# Patient Record
Sex: FEMALE | Race: WHITE | NOT HISPANIC OR LATINO | URBAN - METROPOLITAN AREA
[De-identification: names, ages, dates, MRNs, and addresses within clinical notes are randomized per-mention and may not be internally consistent; named-entity substitution may affect disease eponyms.]

---

## 2017-02-27 ENCOUNTER — EMERGENCY (EMERGENCY)
Facility: HOSPITAL | Age: 62
LOS: 1 days | Discharge: HOME | End: 2017-02-27
Attending: EMERGENCY MEDICINE | Admitting: EMERGENCY MEDICINE
Payer: MEDICAID

## 2017-02-27 VITALS
SYSTOLIC BLOOD PRESSURE: 135 MMHG | OXYGEN SATURATION: 98 % | HEART RATE: 97 BPM | RESPIRATION RATE: 20 BRPM | TEMPERATURE: 98 F | DIASTOLIC BLOOD PRESSURE: 80 MMHG

## 2017-02-27 DIAGNOSIS — R07.89 OTHER CHEST PAIN: ICD-10-CM

## 2017-02-27 LAB
ALBUMIN SERPL ELPH-MCNC: 4.6 G/DL — SIGNIFICANT CHANGE UP (ref 3.3–5)
ALP SERPL-CCNC: 93 U/L — SIGNIFICANT CHANGE UP (ref 40–120)
ALT FLD-CCNC: 24 U/L RC — SIGNIFICANT CHANGE UP (ref 10–45)
ANION GAP SERPL CALC-SCNC: 15 MMOL/L — SIGNIFICANT CHANGE UP (ref 5–17)
APTT BLD: 32.7 SEC — SIGNIFICANT CHANGE UP (ref 27.5–37.4)
AST SERPL-CCNC: 25 U/L — SIGNIFICANT CHANGE UP (ref 10–40)
BASOPHILS # BLD AUTO: 0.1 K/UL — SIGNIFICANT CHANGE UP (ref 0–0.2)
BASOPHILS NFR BLD AUTO: 0.8 % — SIGNIFICANT CHANGE UP (ref 0–2)
BILIRUB SERPL-MCNC: 0.6 MG/DL — SIGNIFICANT CHANGE UP (ref 0.2–1.2)
BUN SERPL-MCNC: 11 MG/DL — SIGNIFICANT CHANGE UP (ref 7–23)
CALCIUM SERPL-MCNC: 9.8 MG/DL — SIGNIFICANT CHANGE UP (ref 8.4–10.5)
CHLORIDE SERPL-SCNC: 97 MMOL/L — SIGNIFICANT CHANGE UP (ref 96–108)
CK MB CFR SERPL CALC: 1.8 NG/ML — SIGNIFICANT CHANGE UP (ref 0–3.8)
CK SERPL-CCNC: 69 U/L — SIGNIFICANT CHANGE UP (ref 25–170)
CO2 SERPL-SCNC: 26 MMOL/L — SIGNIFICANT CHANGE UP (ref 22–31)
CREAT SERPL-MCNC: 0.62 MG/DL — SIGNIFICANT CHANGE UP (ref 0.5–1.3)
D DIMER BLD IA.RAPID-MCNC: <150 NG/ML DDU — SIGNIFICANT CHANGE UP
EOSINOPHIL # BLD AUTO: 0 K/UL — SIGNIFICANT CHANGE UP (ref 0–0.5)
EOSINOPHIL NFR BLD AUTO: 0.1 % — SIGNIFICANT CHANGE UP (ref 0–6)
GLUCOSE SERPL-MCNC: 109 MG/DL — HIGH (ref 70–99)
HCT VFR BLD CALC: 45.3 % — HIGH (ref 34.5–45)
HGB BLD-MCNC: 15.4 G/DL — SIGNIFICANT CHANGE UP (ref 11.5–15.5)
INR BLD: 1.03 RATIO — SIGNIFICANT CHANGE UP (ref 0.88–1.16)
LYMPHOCYTES # BLD AUTO: 2 K/UL — SIGNIFICANT CHANGE UP (ref 1–3.3)
LYMPHOCYTES # BLD AUTO: 23.6 % — SIGNIFICANT CHANGE UP (ref 13–44)
MAGNESIUM SERPL-MCNC: 2 MG/DL — SIGNIFICANT CHANGE UP (ref 1.6–2.6)
MCHC RBC-ENTMCNC: 33.3 PG — SIGNIFICANT CHANGE UP (ref 27–34)
MCHC RBC-ENTMCNC: 33.9 GM/DL — SIGNIFICANT CHANGE UP (ref 32–36)
MCV RBC AUTO: 98.1 FL — SIGNIFICANT CHANGE UP (ref 80–100)
MONOCYTES # BLD AUTO: 0.9 K/UL — SIGNIFICANT CHANGE UP (ref 0–0.9)
MONOCYTES NFR BLD AUTO: 10.1 % — SIGNIFICANT CHANGE UP (ref 2–14)
NEUTROPHILS # BLD AUTO: 5.7 K/UL — SIGNIFICANT CHANGE UP (ref 1.8–7.4)
NEUTROPHILS NFR BLD AUTO: 65.4 % — SIGNIFICANT CHANGE UP (ref 43–77)
NT-PROBNP SERPL-SCNC: 681 PG/ML — HIGH (ref 0–300)
PHOSPHATE SERPL-MCNC: 4 MG/DL — SIGNIFICANT CHANGE UP (ref 2.5–4.5)
PLATELET # BLD AUTO: 175 K/UL — SIGNIFICANT CHANGE UP (ref 150–400)
POTASSIUM SERPL-MCNC: 4.5 MMOL/L — SIGNIFICANT CHANGE UP (ref 3.5–5.3)
POTASSIUM SERPL-SCNC: 4.5 MMOL/L — SIGNIFICANT CHANGE UP (ref 3.5–5.3)
PROT SERPL-MCNC: 7.8 G/DL — SIGNIFICANT CHANGE UP (ref 6–8.3)
PROTHROM AB SERPL-ACNC: 11.1 SEC — SIGNIFICANT CHANGE UP (ref 10–13.1)
RBC # BLD: 4.62 M/UL — SIGNIFICANT CHANGE UP (ref 3.8–5.2)
RBC # FLD: 11.5 % — SIGNIFICANT CHANGE UP (ref 10.3–14.5)
SODIUM SERPL-SCNC: 138 MMOL/L — SIGNIFICANT CHANGE UP (ref 135–145)
TROPONIN T SERPL-MCNC: <0.01 NG/ML — SIGNIFICANT CHANGE UP (ref 0–0.06)
TROPONIN T SERPL-MCNC: <0.01 NG/ML — SIGNIFICANT CHANGE UP (ref 0–0.06)
WBC # BLD: 8.7 K/UL — SIGNIFICANT CHANGE UP (ref 3.8–10.5)
WBC # FLD AUTO: 8.7 K/UL — SIGNIFICANT CHANGE UP (ref 3.8–10.5)

## 2017-02-27 PROCEDURE — 71010: CPT | Mod: 26

## 2017-02-27 PROCEDURE — 93010 ELECTROCARDIOGRAM REPORT: CPT

## 2017-02-27 PROCEDURE — 99220: CPT | Mod: 25

## 2017-02-27 PROCEDURE — 93010 ELECTROCARDIOGRAM REPORT: CPT | Mod: 77

## 2017-02-27 PROCEDURE — 74177 CT ABD & PELVIS W/CONTRAST: CPT | Mod: 26

## 2017-02-27 PROCEDURE — 93308 TTE F-UP OR LMTD: CPT | Mod: 26

## 2017-02-27 RX ORDER — DIAZEPAM 5 MG
5 TABLET ORAL ONCE
Qty: 0 | Refills: 0 | Status: DISCONTINUED | OUTPATIENT
Start: 2017-02-27 | End: 2017-02-27

## 2017-02-27 RX ORDER — SODIUM CHLORIDE 9 MG/ML
1000 INJECTION INTRAMUSCULAR; INTRAVENOUS; SUBCUTANEOUS ONCE
Qty: 0 | Refills: 0 | Status: COMPLETED | OUTPATIENT
Start: 2017-02-27 | End: 2017-02-27

## 2017-02-27 RX ORDER — SODIUM CHLORIDE 9 MG/ML
3 INJECTION INTRAMUSCULAR; INTRAVENOUS; SUBCUTANEOUS ONCE
Qty: 0 | Refills: 0 | Status: COMPLETED | OUTPATIENT
Start: 2017-02-27 | End: 2017-02-27

## 2017-02-27 RX ORDER — SODIUM CHLORIDE 9 MG/ML
3 INJECTION INTRAMUSCULAR; INTRAVENOUS; SUBCUTANEOUS EVERY 8 HOURS
Qty: 0 | Refills: 0 | Status: DISCONTINUED | OUTPATIENT
Start: 2017-02-27 | End: 2017-03-03

## 2017-02-27 RX ADMIN — SODIUM CHLORIDE 3 MILLILITER(S): 9 INJECTION INTRAMUSCULAR; INTRAVENOUS; SUBCUTANEOUS at 22:28

## 2017-02-27 RX ADMIN — SODIUM CHLORIDE 3 MILLILITER(S): 9 INJECTION INTRAMUSCULAR; INTRAVENOUS; SUBCUTANEOUS at 11:36

## 2017-02-27 RX ADMIN — SODIUM CHLORIDE 1000 MILLILITER(S): 9 INJECTION INTRAMUSCULAR; INTRAVENOUS; SUBCUTANEOUS at 13:48

## 2017-02-27 RX ADMIN — Medication 5 MILLIGRAM(S): at 23:33

## 2017-02-27 NOTE — ED ADULT NURSE NOTE - OBJECTIVE STATEMENT
pt states yesterday felt "fibulation" for entire day. denies any cardiac history Chest pain,SOB, N/V or recent illness.

## 2017-02-27 NOTE — ED PROVIDER NOTE - PROGRESS NOTE DETAILS
pocus shows cystic appearing structure within the liver and adjacent to the diaphragm. unclear etiology. pt is a former smoker. d/w pt and will do CT scan to further evaluate CT scan shows nonspecific cystic structure. recommend MRI to further evaluate. pt in CDU, d/w pt will order MRI while in the cdu

## 2017-02-27 NOTE — ED CDU PROVIDER NOTE - DETAILS
60 y/o F p/w chest pain   - observation status with frequent re-evaluations  - serial cardiac enzymes with repeat EKGs  - exercise nuclear stress test  - plan d/w Dr. Mckeon

## 2017-02-27 NOTE — ED PROVIDER NOTE - MEDICAL DECISION MAKING DETAILS
62 y/o female h/o ms presenting with intermittent chest pain and sob. ekg shows no evidence of acute st changes or elevation. POCUS shows no evidence of wall motion abnormalities. pt with some pleuritic pain. no LE edema and negative homans. d dimer negative making acute PE less likely. will place in the cdu for stress testing. 62 y/o female h/o ms presenting with intermittent chest pain, palpitations and sob. ekg shows no evidence of acute st changes or elevation. POCUS shows no evidence of wall motion abnormalities. pt with some pleuritic pain. no LE edema and negative homans. d dimer negative making acute PE less likely. EKG shows no evidence of atrial fibrilallation, pt on tele. no h/o thyorid disease. will place in the cdu for stress testing.

## 2017-02-27 NOTE — ED CDU PROVIDER NOTE - MEDICAL DECISION MAKING DETAILS
Please see attending physician note for medical decision making Attending Mckeon: 60 y/o female h/o MS presenting with palpitations and mild dyspnea. upon arrival ekg shows no evidence of atrial fibrillation, pt placed on tele. with pleuritic discomfort d dimer ordered which was negative making acute PE less likely. no h/o thyroid disease. pt placed in the cdu for cardiac monitoring and stress testing. additionally pt found to have a cystic structure on pocus, ct performed with inconclusive results, recommend MRI which was ordered. will re-eval

## 2017-02-27 NOTE — ED PROVIDER NOTE - OBJECTIVE STATEMENT
60 y/o female with h/o MS on avenox presenting with chest pain. Yesterday pt felt some palpitations that resolved. per pt for most of the afternoon felt increased pal;pitations. last night began with chest heaviness and difficulty breathing at approximately 9 pm. pt states was up all night as did not feel well. went to [facility] urgent care and sent to the ed. no known h/o afib. pt states has had palpitatioins for a long time but never like this. some pain with inspiration last night and slight today. no recent travel. last stress test years ago. no recent fevers or illnesses    PCP: Guero Cárdenas 62 y/o female with h/o MS on avenox presenting with chest pain. Yesterday pt felt some palpitations that resolved. per pt for most of the afternoon felt increased pal;pitations. last night began with chest heaviness and difficulty breathing at approximately 9 pm. pt states was up all night as did not feel well. went to [facility] urgent care and sent to the ed. no known h/o afib. pt states has had palpitatioins for a long time but never like this. some pain with inspiration last night and slight today. no recent travel. last stress test years ago. no recent fevers or illnesses. denies any sob currently. no recent uri. did receive aspirin at urgent care    PCP: Guero Cárdenas

## 2017-02-27 NOTE — ED CDU PROVIDER NOTE - OBJECTIVE STATEMENT
60 y/o female with h/o MS on avenox presenting with chest pain. Yesterday pt felt some palpitations that resolved. per pt for most of the afternoon felt increased pal;pitations. last night began with chest heaviness and difficulty breathing at approximately 9 pm. pt states was up all night as did not feel well. went to [facility] urgent care and sent to the ed. no known h/o afib. pt states has had palpitatioins for a long time but never like this. some pain with inspiration last night and slight today. no recent travel. last stress test years ago. no recent fevers or illnesses. denies any sob currently. no recent uri. did receive aspirin at urgent care    PCP: Guero Cárdenas

## 2017-02-27 NOTE — ED CDU PROVIDER NOTE - PROGRESS NOTE DETAILS
CT a/p shows cyst on liver, recommending MRI of abdomen to characterize. Discussed with patient, will obtain MRI abdomen while patient is here being evaluated for ACS. patient understands and agrees. -Elda Rawls PA-C Patient resting comfortably in bed, NAD, VSS, currently asymptomatic but worrisome that she will not be able to sleep. Will give patient Valium 5mg at bedtime a continue to observe. Richard Siddiqui PA-C. Patient resting in bed, NAD, VSS, no events on tele. Will give Valium 5mg and reassess. Richard Siddiqui PA-C Patient asleep, NAD, VSS, no events on tele. Richard Siddiqui PA-C. Patient resting in bed comfortably. NAD. Reports chest tightness improved but is still intermittent, feels it when she sits up and sometimes pleuritic. Vital Signs Stable. No events on telemetry monitor.  -Ysabel Chambers PA-C Pt at stress lab. - Ysabel Chambers PA-C I have personally performed a face to face diagnostic evaluation on this patient.  I have reviewed the ACP note and agree with the history, exam, and plan of care, except as noted.  History and Exam by me shows patient resting comfortably in exam room in no distress.  RRR, offering no complaint at this time.  Results of nuclear stress test pending, labs normal to date.  MRI abdomen scheduled but not performed.  Will re-evaluate after full testing performed.  Donny Goyal M.D. Patient walking around CDU in NAD. Reports she is feeling much better. Informed patient of normal stress results. Pt now willing to wait for MRI abd. - Ysabel Chambers PA-C Pt no longer wants to wait for MRI and will obtain as outpatient. Feels well, no complaints. VSS. Discussed discharge with Dr. Peter. - Ysabel Chambers PA-C I, Dr Binta Peter have seen and evaluated the patient. The patient reports improvement in symptoms. The patient is stable for discharge home and will follow up with their primary physician.

## 2017-02-27 NOTE — ED CDU PROVIDER NOTE - PLAN OF CARE
1. Stay well hydrated, drink plenty of fluids. You may take Motrin every 6 hours as needed for pain.  2. Follow up with your Primary Care Physician as soon as possible for further evaluation. Bring a copy of your test results with you when you follow up.   3. Return to the Emergency Department for any concerning symptoms. 1. Stay well hydrated, drink plenty of fluids. You may take Motrin every 6-8 hours as needed for pain.  2. Follow up with cardiologist Dr. Solorzano (information provided) and Primary Care Provider Dr. Solis for follow up MRI of your abdomen as soon as possible. Bring a copy of your test results with you when you follow up.   3. Return to ER for new or worsening chest pain, shortness of breath, palpitations, or any other concerning symptoms.

## 2017-02-28 VITALS
SYSTOLIC BLOOD PRESSURE: 126 MMHG | TEMPERATURE: 99 F | HEART RATE: 92 BPM | DIASTOLIC BLOOD PRESSURE: 69 MMHG | OXYGEN SATURATION: 96 % | RESPIRATION RATE: 18 BRPM

## 2017-02-28 PROCEDURE — 93016 CV STRESS TEST SUPVJ ONLY: CPT

## 2017-02-28 PROCEDURE — 82550 ASSAY OF CK (CPK): CPT

## 2017-02-28 PROCEDURE — 99217: CPT

## 2017-02-28 PROCEDURE — 74177 CT ABD & PELVIS W/CONTRAST: CPT

## 2017-02-28 PROCEDURE — 83880 ASSAY OF NATRIURETIC PEPTIDE: CPT

## 2017-02-28 PROCEDURE — 85027 COMPLETE CBC AUTOMATED: CPT

## 2017-02-28 PROCEDURE — 93005 ELECTROCARDIOGRAM TRACING: CPT

## 2017-02-28 PROCEDURE — 93308 TTE F-UP OR LMTD: CPT

## 2017-02-28 PROCEDURE — 83735 ASSAY OF MAGNESIUM: CPT

## 2017-02-28 PROCEDURE — 93017 CV STRESS TEST TRACING ONLY: CPT

## 2017-02-28 PROCEDURE — 99284 EMERGENCY DEPT VISIT MOD MDM: CPT | Mod: 25

## 2017-02-28 PROCEDURE — 82553 CREATINE MB FRACTION: CPT

## 2017-02-28 PROCEDURE — 71045 X-RAY EXAM CHEST 1 VIEW: CPT

## 2017-02-28 PROCEDURE — 85379 FIBRIN DEGRADATION QUANT: CPT

## 2017-02-28 PROCEDURE — 78452 HT MUSCLE IMAGE SPECT MULT: CPT | Mod: 26

## 2017-02-28 PROCEDURE — G0378: CPT

## 2017-02-28 PROCEDURE — A9500: CPT

## 2017-02-28 PROCEDURE — 84484 ASSAY OF TROPONIN QUANT: CPT

## 2017-02-28 PROCEDURE — 80053 COMPREHEN METABOLIC PANEL: CPT

## 2017-02-28 PROCEDURE — 93018 CV STRESS TEST I&R ONLY: CPT

## 2017-02-28 PROCEDURE — 84100 ASSAY OF PHOSPHORUS: CPT

## 2017-02-28 PROCEDURE — 78452 HT MUSCLE IMAGE SPECT MULT: CPT

## 2017-02-28 PROCEDURE — 85730 THROMBOPLASTIN TIME PARTIAL: CPT

## 2017-02-28 PROCEDURE — 85610 PROTHROMBIN TIME: CPT

## 2017-02-28 RX ADMIN — SODIUM CHLORIDE 3 MILLILITER(S): 9 INJECTION INTRAMUSCULAR; INTRAVENOUS; SUBCUTANEOUS at 05:10

## 2017-02-28 NOTE — ED ADULT NURSE REASSESSMENT NOTE - NS ED NURSE REASSESS COMMENT FT1
Pt received from ED, resting in stretcher, A&O x4, breathing even and unlabored with no distress noted. Pt denies pain or discomfort, SOB or numbness or tingling at this time. Pt maintained on Tele with sinus rhythm noted. Pending CE at 1745 and nuc stress in the am. IV site, clean, dry and intact with no signs or symptoms of infection present at this time. Pt ambulating independently. Pt oriented to unit, safety maintained and call bell within reach. Will continue to monitor and provider support.
Pt resting in stretcher. VSS. No acute distress. NSR on cardiac monitor. Safety maintained. Will continue to monitor.
Pt received from LUIS FERNANDO Vargas. Plan of care was discussed. No complaints of chest pain, SOB, dizziness or palpitations. Pt denies any pain. Safety & comfort measures maintained. Call bell in reach. Will continue to monitor.

## 2017-03-13 ENCOUNTER — TRANSCRIPTION ENCOUNTER (OUTPATIENT)
Age: 62
End: 2017-03-13

## 2017-03-13 PROBLEM — Z00.00 ENCOUNTER FOR PREVENTIVE HEALTH EXAMINATION: Status: ACTIVE | Noted: 2017-03-13

## 2017-03-15 ENCOUNTER — OUTPATIENT (OUTPATIENT)
Dept: OUTPATIENT SERVICES | Facility: HOSPITAL | Age: 62
LOS: 1 days | End: 2017-03-15
Payer: MEDICAID

## 2017-03-15 ENCOUNTER — APPOINTMENT (OUTPATIENT)
Dept: MRI IMAGING | Facility: CLINIC | Age: 62
End: 2017-03-15

## 2017-03-15 DIAGNOSIS — Z00.8 ENCOUNTER FOR OTHER GENERAL EXAMINATION: ICD-10-CM

## 2017-03-16 PROCEDURE — 72197 MRI PELVIS W/O & W/DYE: CPT

## 2017-03-16 PROCEDURE — A9585: CPT

## 2017-03-16 PROCEDURE — 82565 ASSAY OF CREATININE: CPT

## 2017-03-16 PROCEDURE — 74183 MRI ABD W/O CNTR FLWD CNTR: CPT

## 2017-08-06 ENCOUNTER — EMERGENCY (EMERGENCY)
Facility: HOSPITAL | Age: 62
LOS: 1 days | Discharge: HOME | End: 2017-08-06
Attending: EMERGENCY MEDICINE | Admitting: EMERGENCY MEDICINE
Payer: MEDICAID

## 2017-08-06 VITALS
DIASTOLIC BLOOD PRESSURE: 69 MMHG | RESPIRATION RATE: 15 BRPM | TEMPERATURE: 99 F | HEART RATE: 90 BPM | SYSTOLIC BLOOD PRESSURE: 157 MMHG | OXYGEN SATURATION: 97 %

## 2017-08-06 VITALS
TEMPERATURE: 99 F | OXYGEN SATURATION: 100 % | DIASTOLIC BLOOD PRESSURE: 95 MMHG | RESPIRATION RATE: 18 BRPM | HEART RATE: 99 BPM | SYSTOLIC BLOOD PRESSURE: 191 MMHG | WEIGHT: 119.93 LBS

## 2017-08-06 DIAGNOSIS — K29.70 GASTRITIS, UNSPECIFIED, WITHOUT BLEEDING: ICD-10-CM

## 2017-08-06 LAB
ALBUMIN SERPL ELPH-MCNC: 4.4 G/DL — SIGNIFICANT CHANGE UP (ref 3.3–5)
ALP SERPL-CCNC: 113 U/L — SIGNIFICANT CHANGE UP (ref 40–120)
ALT FLD-CCNC: 63 U/L — SIGNIFICANT CHANGE UP (ref 12–78)
ANION GAP SERPL CALC-SCNC: 11 MMOL/L — SIGNIFICANT CHANGE UP (ref 5–17)
APPEARANCE UR: CLEAR — SIGNIFICANT CHANGE UP
AST SERPL-CCNC: 55 U/L — HIGH (ref 15–37)
BASOPHILS # BLD AUTO: 0.1 K/UL — SIGNIFICANT CHANGE UP (ref 0–0.2)
BASOPHILS NFR BLD AUTO: 0.8 % — SIGNIFICANT CHANGE UP (ref 0–2)
BILIRUB SERPL-MCNC: 0.5 MG/DL — SIGNIFICANT CHANGE UP (ref 0.2–1.2)
BILIRUB UR-MCNC: NEGATIVE — SIGNIFICANT CHANGE UP
BUN SERPL-MCNC: 12 MG/DL — SIGNIFICANT CHANGE UP (ref 7–23)
CALCIUM SERPL-MCNC: 8.7 MG/DL — SIGNIFICANT CHANGE UP (ref 8.5–10.1)
CHLORIDE SERPL-SCNC: 96 MMOL/L — SIGNIFICANT CHANGE UP (ref 96–108)
CK MB BLD-MCNC: 2.6 % — SIGNIFICANT CHANGE UP (ref 0–3.5)
CK MB CFR SERPL CALC: 2.2 NG/ML — SIGNIFICANT CHANGE UP (ref 0–3.6)
CK SERPL-CCNC: 85 U/L — SIGNIFICANT CHANGE UP (ref 26–192)
CO2 SERPL-SCNC: 25 MMOL/L — SIGNIFICANT CHANGE UP (ref 22–31)
COLOR SPEC: YELLOW — SIGNIFICANT CHANGE UP
CREAT SERPL-MCNC: 0.59 MG/DL — SIGNIFICANT CHANGE UP (ref 0.5–1.3)
D DIMER BLD IA.RAPID-MCNC: <150 NG/ML DDU — SIGNIFICANT CHANGE UP
DIFF PNL FLD: NEGATIVE — SIGNIFICANT CHANGE UP
EOSINOPHIL # BLD AUTO: 0 K/UL — SIGNIFICANT CHANGE UP (ref 0–0.5)
EOSINOPHIL NFR BLD AUTO: 0.1 % — SIGNIFICANT CHANGE UP (ref 0–6)
GLUCOSE SERPL-MCNC: 121 MG/DL — HIGH (ref 70–99)
GLUCOSE UR QL: NEGATIVE — SIGNIFICANT CHANGE UP
HCT VFR BLD CALC: 46.3 % — HIGH (ref 34.5–45)
HGB BLD-MCNC: 15.6 G/DL — HIGH (ref 11.5–15.5)
KETONES UR-MCNC: NEGATIVE — SIGNIFICANT CHANGE UP
LACTATE SERPL-SCNC: 1.6 MMOL/L — SIGNIFICANT CHANGE UP (ref 0.7–2)
LEUKOCYTE ESTERASE UR-ACNC: NEGATIVE — SIGNIFICANT CHANGE UP
LYMPHOCYTES # BLD AUTO: 1.1 K/UL — SIGNIFICANT CHANGE UP (ref 1–3.3)
LYMPHOCYTES # BLD AUTO: 8.3 % — LOW (ref 13–44)
MCHC RBC-ENTMCNC: 33.8 GM/DL — SIGNIFICANT CHANGE UP (ref 32–36)
MCHC RBC-ENTMCNC: 34 PG — SIGNIFICANT CHANGE UP (ref 27–34)
MCV RBC AUTO: 100.5 FL — HIGH (ref 80–100)
MONOCYTES # BLD AUTO: 0.5 K/UL — SIGNIFICANT CHANGE UP (ref 0–0.9)
MONOCYTES NFR BLD AUTO: 3.5 % — SIGNIFICANT CHANGE UP (ref 1–9)
NEUTROPHILS # BLD AUTO: 11.4 K/UL — HIGH (ref 1.8–7.4)
NEUTROPHILS NFR BLD AUTO: 87.4 % — HIGH (ref 43–77)
NITRITE UR-MCNC: NEGATIVE — SIGNIFICANT CHANGE UP
PH UR: 6 — SIGNIFICANT CHANGE UP (ref 5–8)
PLATELET # BLD AUTO: 180 K/UL — SIGNIFICANT CHANGE UP (ref 150–400)
POTASSIUM SERPL-MCNC: 3.5 MMOL/L — SIGNIFICANT CHANGE UP (ref 3.5–5.3)
POTASSIUM SERPL-SCNC: 3.5 MMOL/L — SIGNIFICANT CHANGE UP (ref 3.5–5.3)
PROCALCITONIN SERPL-MCNC: <0.05 — SIGNIFICANT CHANGE UP (ref 0–0.04)
PROT SERPL-MCNC: 8.3 G/DL — SIGNIFICANT CHANGE UP (ref 6–8.3)
PROT UR-MCNC: NEGATIVE — SIGNIFICANT CHANGE UP
RBC # BLD: 4.6 M/UL — SIGNIFICANT CHANGE UP (ref 3.8–5.2)
RBC # FLD: 11.9 % — SIGNIFICANT CHANGE UP (ref 10.3–14.5)
SODIUM SERPL-SCNC: 132 MMOL/L — LOW (ref 135–145)
SP GR SPEC: 1.01 — SIGNIFICANT CHANGE UP (ref 1.01–1.02)
TROPONIN I SERPL-MCNC: <.015 NG/ML — SIGNIFICANT CHANGE UP (ref 0.01–0.04)
UROBILINOGEN FLD QL: NEGATIVE — SIGNIFICANT CHANGE UP
WBC # BLD: 13 K/UL — HIGH (ref 3.8–10.5)
WBC # FLD AUTO: 13 K/UL — HIGH (ref 3.8–10.5)

## 2017-08-06 PROCEDURE — 84145 PROCALCITONIN (PCT): CPT

## 2017-08-06 PROCEDURE — 82550 ASSAY OF CK (CPK): CPT

## 2017-08-06 PROCEDURE — 85027 COMPLETE CBC AUTOMATED: CPT

## 2017-08-06 PROCEDURE — 96374 THER/PROPH/DIAG INJ IV PUSH: CPT

## 2017-08-06 PROCEDURE — 71020: CPT | Mod: 26

## 2017-08-06 PROCEDURE — 84484 ASSAY OF TROPONIN QUANT: CPT

## 2017-08-06 PROCEDURE — 82553 CREATINE MB FRACTION: CPT

## 2017-08-06 PROCEDURE — 93005 ELECTROCARDIOGRAM TRACING: CPT

## 2017-08-06 PROCEDURE — 71046 X-RAY EXAM CHEST 2 VIEWS: CPT

## 2017-08-06 PROCEDURE — 99284 EMERGENCY DEPT VISIT MOD MDM: CPT | Mod: 25

## 2017-08-06 PROCEDURE — 85379 FIBRIN DEGRADATION QUANT: CPT

## 2017-08-06 PROCEDURE — 99285 EMERGENCY DEPT VISIT HI MDM: CPT

## 2017-08-06 PROCEDURE — 83605 ASSAY OF LACTIC ACID: CPT

## 2017-08-06 PROCEDURE — 87040 BLOOD CULTURE FOR BACTERIA: CPT

## 2017-08-06 PROCEDURE — 81003 URINALYSIS AUTO W/O SCOPE: CPT

## 2017-08-06 PROCEDURE — 36415 COLL VENOUS BLD VENIPUNCTURE: CPT

## 2017-08-06 PROCEDURE — 80053 COMPREHEN METABOLIC PANEL: CPT

## 2017-08-06 PROCEDURE — 96361 HYDRATE IV INFUSION ADD-ON: CPT

## 2017-08-06 PROCEDURE — 87086 URINE CULTURE/COLONY COUNT: CPT

## 2017-08-06 RX ORDER — SODIUM CHLORIDE 9 MG/ML
1000 INJECTION INTRAMUSCULAR; INTRAVENOUS; SUBCUTANEOUS ONCE
Qty: 0 | Refills: 0 | Status: COMPLETED | OUTPATIENT
Start: 2017-08-06 | End: 2017-08-06

## 2017-08-06 RX ORDER — KETOROLAC TROMETHAMINE 30 MG/ML
30 SYRINGE (ML) INJECTION ONCE
Qty: 0 | Refills: 0 | Status: DISCONTINUED | OUTPATIENT
Start: 2017-08-06 | End: 2017-08-06

## 2017-08-06 RX ORDER — SODIUM CHLORIDE 9 MG/ML
3 INJECTION INTRAMUSCULAR; INTRAVENOUS; SUBCUTANEOUS ONCE
Qty: 0 | Refills: 0 | Status: COMPLETED | OUTPATIENT
Start: 2017-08-06 | End: 2017-08-06

## 2017-08-06 RX ADMIN — SODIUM CHLORIDE 3 MILLILITER(S): 9 INJECTION INTRAMUSCULAR; INTRAVENOUS; SUBCUTANEOUS at 17:51

## 2017-08-06 RX ADMIN — SODIUM CHLORIDE 2000 MILLILITER(S): 9 INJECTION INTRAMUSCULAR; INTRAVENOUS; SUBCUTANEOUS at 18:25

## 2017-08-06 RX ADMIN — Medication 30 MILLIGRAM(S): at 18:24

## 2017-08-06 RX ADMIN — Medication 30 MILLIGRAM(S): at 17:50

## 2017-08-06 RX ADMIN — SODIUM CHLORIDE 1000 MILLILITER(S): 9 INJECTION INTRAMUSCULAR; INTRAVENOUS; SUBCUTANEOUS at 17:50

## 2017-08-06 NOTE — ED PROVIDER NOTE - CARE PLAN
Principal Discharge DX:	Chest pain, unspecified type  Secondary Diagnosis:	Gastritis without bleeding, unspecified chronicity, unspecified gastritis type

## 2017-08-06 NOTE — ED PROVIDER NOTE - OBJECTIVE STATEMENT
61 yo white female well up until at [facility] early this afternoon when she developed nausea and slight fatigue. Patient then went to visit her children at which time she developed subjective fever followed by chills and then ill defined sharp anterior sternal non radiating chest pains which has been constant since its onset. No cough/abdominal pains/vomiting or diarrhea. No back pain and no dysuria or hematuria. Never has had this before.

## 2017-08-06 NOTE — ED PROVIDER NOTE - PROGRESS NOTE DETAILS
Feeling better at this time and wants to go home Feeling better at this time and wants to go home. States lower chest epigastric region discomfort is gone

## 2017-08-06 NOTE — ED PROVIDER NOTE - CONSTITUTIONAL, MLM
normal... Uncomfortable appearing white female, well nourished, awake, alert, oriented to person, place, time/situation and in mild distress.

## 2017-08-07 ENCOUNTER — INPATIENT (INPATIENT)
Facility: HOSPITAL | Age: 62
LOS: 0 days | Discharge: HOME | DRG: 316 | End: 2017-08-08
Attending: INTERNAL MEDICINE | Admitting: INTERNAL MEDICINE
Payer: MEDICAID

## 2017-08-07 ENCOUNTER — TRANSCRIPTION ENCOUNTER (OUTPATIENT)
Age: 62
End: 2017-08-07

## 2017-08-07 VITALS
HEART RATE: 95 BPM | RESPIRATION RATE: 20 BRPM | OXYGEN SATURATION: 96 % | DIASTOLIC BLOOD PRESSURE: 81 MMHG | WEIGHT: 125 LBS | SYSTOLIC BLOOD PRESSURE: 137 MMHG | TEMPERATURE: 99 F

## 2017-08-07 DIAGNOSIS — I31.9 DISEASE OF PERICARDIUM, UNSPECIFIED: ICD-10-CM

## 2017-08-07 LAB
ALBUMIN SERPL ELPH-MCNC: 3.9 G/DL — SIGNIFICANT CHANGE UP (ref 3.3–5)
ALP SERPL-CCNC: 75 U/L — SIGNIFICANT CHANGE UP (ref 40–120)
ALT FLD-CCNC: 30 U/L RC — SIGNIFICANT CHANGE UP (ref 10–45)
ANION GAP SERPL CALC-SCNC: 11 MMOL/L — SIGNIFICANT CHANGE UP (ref 5–17)
AST SERPL-CCNC: 26 U/L — SIGNIFICANT CHANGE UP (ref 10–40)
BASE EXCESS BLDV CALC-SCNC: 1.8 MMOL/L — SIGNIFICANT CHANGE UP (ref -2–2)
BASOPHILS # BLD AUTO: 0.1 K/UL — SIGNIFICANT CHANGE UP (ref 0–0.2)
BASOPHILS NFR BLD AUTO: 0.8 % — SIGNIFICANT CHANGE UP (ref 0–2)
BILIRUB SERPL-MCNC: 0.7 MG/DL — SIGNIFICANT CHANGE UP (ref 0.2–1.2)
BUN SERPL-MCNC: 9 MG/DL — SIGNIFICANT CHANGE UP (ref 7–23)
CA-I SERPL-SCNC: 1.22 MMOL/L — SIGNIFICANT CHANGE UP (ref 1.12–1.3)
CALCIUM SERPL-MCNC: 8.8 MG/DL — SIGNIFICANT CHANGE UP (ref 8.4–10.5)
CHLORIDE BLDV-SCNC: 104 MMOL/L — SIGNIFICANT CHANGE UP (ref 96–108)
CHLORIDE SERPL-SCNC: 102 MMOL/L — SIGNIFICANT CHANGE UP (ref 96–108)
CK MB CFR SERPL CALC: 1.7 NG/ML — SIGNIFICANT CHANGE UP (ref 0–3.8)
CO2 BLDV-SCNC: 29 MMOL/L — SIGNIFICANT CHANGE UP (ref 22–30)
CO2 SERPL-SCNC: 25 MMOL/L — SIGNIFICANT CHANGE UP (ref 22–31)
CREAT SERPL-MCNC: 0.55 MG/DL — SIGNIFICANT CHANGE UP (ref 0.5–1.3)
CULTURE RESULTS: SIGNIFICANT CHANGE UP
EOSINOPHIL # BLD AUTO: 0 K/UL — SIGNIFICANT CHANGE UP (ref 0–0.5)
EOSINOPHIL NFR BLD AUTO: 0.6 % — SIGNIFICANT CHANGE UP (ref 0–6)
GAS PNL BLDV: 137 MMOL/L — SIGNIFICANT CHANGE UP (ref 136–145)
GAS PNL BLDV: SIGNIFICANT CHANGE UP
GAS PNL BLDV: SIGNIFICANT CHANGE UP
GLUCOSE BLDV-MCNC: 103 MG/DL — HIGH (ref 70–99)
GLUCOSE SERPL-MCNC: 99 MG/DL — SIGNIFICANT CHANGE UP (ref 70–99)
HCO3 BLDV-SCNC: 27 MMOL/L — SIGNIFICANT CHANGE UP (ref 21–29)
HCT VFR BLD CALC: 42.4 % — SIGNIFICANT CHANGE UP (ref 34.5–45)
HCT VFR BLDA CALC: 45 % — SIGNIFICANT CHANGE UP (ref 39–50)
HGB BLD CALC-MCNC: 14.6 G/DL — SIGNIFICANT CHANGE UP (ref 11.5–15.5)
HGB BLD-MCNC: 14.4 G/DL — SIGNIFICANT CHANGE UP (ref 11.5–15.5)
LACTATE BLDV-MCNC: 1.5 MMOL/L — SIGNIFICANT CHANGE UP (ref 0.7–2)
LIDOCAIN IGE QN: 14 U/L — SIGNIFICANT CHANGE UP (ref 7–60)
LYMPHOCYTES # BLD AUTO: 1.7 K/UL — SIGNIFICANT CHANGE UP (ref 1–3.3)
LYMPHOCYTES # BLD AUTO: 19.1 % — SIGNIFICANT CHANGE UP (ref 13–44)
MCHC RBC-ENTMCNC: 33.9 GM/DL — SIGNIFICANT CHANGE UP (ref 32–36)
MCHC RBC-ENTMCNC: 35.1 PG — HIGH (ref 27–34)
MCV RBC AUTO: 103 FL — HIGH (ref 80–100)
MONOCYTES # BLD AUTO: 1 K/UL — HIGH (ref 0–0.9)
MONOCYTES NFR BLD AUTO: 11.1 % — SIGNIFICANT CHANGE UP (ref 2–14)
NEUTROPHILS # BLD AUTO: 6 K/UL — SIGNIFICANT CHANGE UP (ref 1.8–7.4)
NEUTROPHILS NFR BLD AUTO: 68.4 % — SIGNIFICANT CHANGE UP (ref 43–77)
PCO2 BLDV: 47 MMHG — SIGNIFICANT CHANGE UP (ref 35–50)
PH BLDV: 7.38 — SIGNIFICANT CHANGE UP (ref 7.35–7.45)
PLATELET # BLD AUTO: 154 K/UL — SIGNIFICANT CHANGE UP (ref 150–400)
PO2 BLDV: 33 MMHG — SIGNIFICANT CHANGE UP (ref 25–45)
POTASSIUM BLDV-SCNC: 3.8 MMOL/L — SIGNIFICANT CHANGE UP (ref 3.5–5)
POTASSIUM SERPL-MCNC: 4.2 MMOL/L — SIGNIFICANT CHANGE UP (ref 3.5–5.3)
POTASSIUM SERPL-SCNC: 4.2 MMOL/L — SIGNIFICANT CHANGE UP (ref 3.5–5.3)
PROT SERPL-MCNC: 6.7 G/DL — SIGNIFICANT CHANGE UP (ref 6–8.3)
RBC # BLD: 4.1 M/UL — SIGNIFICANT CHANGE UP (ref 3.8–5.2)
RBC # FLD: 11.4 % — SIGNIFICANT CHANGE UP (ref 10.3–14.5)
SAO2 % BLDV: 53 % — LOW (ref 67–88)
SODIUM SERPL-SCNC: 138 MMOL/L — SIGNIFICANT CHANGE UP (ref 135–145)
SPECIMEN SOURCE: SIGNIFICANT CHANGE UP
TROPONIN T SERPL-MCNC: <0.01 NG/ML — SIGNIFICANT CHANGE UP (ref 0–0.06)
WBC # BLD: 8.7 K/UL — SIGNIFICANT CHANGE UP (ref 3.8–10.5)
WBC # FLD AUTO: 8.7 K/UL — SIGNIFICANT CHANGE UP (ref 3.8–10.5)

## 2017-08-07 PROCEDURE — 76705 ECHO EXAM OF ABDOMEN: CPT | Mod: 26

## 2017-08-07 PROCEDURE — 74177 CT ABD & PELVIS W/CONTRAST: CPT | Mod: 26

## 2017-08-07 PROCEDURE — 99285 EMERGENCY DEPT VISIT HI MDM: CPT

## 2017-08-07 RX ORDER — ALPRAZOLAM 0.25 MG
0.5 TABLET ORAL AT BEDTIME
Qty: 0 | Refills: 0 | Status: DISCONTINUED | OUTPATIENT
Start: 2017-08-07 | End: 2017-08-08

## 2017-08-07 RX ORDER — INTERFERON BETA-1A 22 UG/.5ML
0 INJECTION, SOLUTION SUBCUTANEOUS
Qty: 0 | Refills: 0 | COMMUNITY

## 2017-08-07 RX ORDER — IBUPROFEN 200 MG
600 TABLET ORAL EVERY 6 HOURS
Qty: 0 | Refills: 0 | Status: DISCONTINUED | OUTPATIENT
Start: 2017-08-07 | End: 2017-08-08

## 2017-08-07 RX ORDER — FAMOTIDINE 10 MG/ML
20 INJECTION INTRAVENOUS ONCE
Qty: 0 | Refills: 0 | Status: COMPLETED | OUTPATIENT
Start: 2017-08-07 | End: 2017-08-07

## 2017-08-07 RX ORDER — COLCHICINE 0.6 MG
0.6 TABLET ORAL DAILY
Qty: 0 | Refills: 0 | Status: DISCONTINUED | OUTPATIENT
Start: 2017-08-07 | End: 2017-08-08

## 2017-08-07 RX ORDER — KETOROLAC TROMETHAMINE 30 MG/ML
15 SYRINGE (ML) INJECTION ONCE
Qty: 0 | Refills: 0 | Status: DISCONTINUED | OUTPATIENT
Start: 2017-08-07 | End: 2017-08-07

## 2017-08-07 RX ORDER — SODIUM CHLORIDE 9 MG/ML
3 INJECTION INTRAMUSCULAR; INTRAVENOUS; SUBCUTANEOUS EVERY 8 HOURS
Qty: 0 | Refills: 0 | Status: DISCONTINUED | OUTPATIENT
Start: 2017-08-07 | End: 2017-08-08

## 2017-08-07 RX ADMIN — Medication 30 MILLILITER(S): at 12:11

## 2017-08-07 RX ADMIN — FAMOTIDINE 20 MILLIGRAM(S): 10 INJECTION INTRAVENOUS at 12:10

## 2017-08-07 RX ADMIN — SODIUM CHLORIDE 3 MILLILITER(S): 9 INJECTION INTRAMUSCULAR; INTRAVENOUS; SUBCUTANEOUS at 21:05

## 2017-08-07 RX ADMIN — Medication 15 MILLIGRAM(S): at 20:17

## 2017-08-07 RX ADMIN — Medication 600 MILLIGRAM(S): at 23:04

## 2017-08-07 RX ADMIN — Medication 15 MILLIGRAM(S): at 19:37

## 2017-08-07 NOTE — ED PROVIDER NOTE - PROGRESS NOTE DETAILS
Attending MD Soares: Spoke with patient, CT results reviewed.  Case discussed with Dr. Blount.  Will call surgery re: liver finding.  Admit to Dr. Blount. Danielle: Spoke with surgery who will see the patient.

## 2017-08-07 NOTE — ED PROVIDER NOTE - OBJECTIVE STATEMENT
62F w/PMH inactive MS, [place] ED visit yesterday for same with neg trop, d-dimer, CXR p/w epigastric pain. Pain began after feeling chills yesterday at 12PM, "crushing" epigastric pain lasting for "a few hours" improved with toradol in ED. Also with subj fever temp measured to 100F. +nausea, no vomiting. Worse with leaning forward and lying back, also deep inspiration. No exertional sx. Normal BM yesterday (no d/melena/BRBPR). No hematuria, dysuria, prior hx gallstones/renal stones. Took 400mg ibuprofen yesterday to some effect. Noticed persistent pain upon awakening today. Drinks 1-2 glass wine a couple times a week, never smoker/drug use. No prior abd surgeries.

## 2017-08-07 NOTE — ED ADULT TRIAGE NOTE - CHIEF COMPLAINT QUOTE
yesterday had pain in center chest and under breasts; had normal ekg yesterday and enzymes were negative; painful when take a deep breath

## 2017-08-07 NOTE — ED PROVIDER NOTE - NS ED ROS FT
+subj fever, +chills, no change in vision, no throat pain, no joint pain, no rashes, no focal neurologic complaints,  all ROS otherwise as per HPI or negative.

## 2017-08-07 NOTE — H&P ADULT - HISTORY OF PRESENT ILLNESS
62F w/PMH inactive MS, [place] ED visit yesterday for same with neg trop, d-dimer, CXR p/w epigastric pain. Pain began after feeling chills yesterday at 12PM, "crushing" epigastric pain lasting for "a few hours" improved with toradol in ED. Also with subj fever temp measured to 100F. +nausea, no vomiting. Worse with leaning forward and lying back, also deep inspiration. No exertional sx. Normal BM yesterday (no d/melena/BRBPR). No hematuria, dysuria, prior hx gallstones/renal stones. Took 400mg ibuprofen yesterday to some effect. Noticed persistent pain upon awakening today. Drinks 1-2 glass wine a couple times a week, never smoker/drug use. No prior abd surgeries.

## 2017-08-07 NOTE — ED PROVIDER NOTE - MEDICAL DECISION MAKING DETAILS
Danielle: 62F w/PMH inactive MS p/w epigastric pain; r/o biliary colic/cholecystitis, pancreatitis, consider gastritis/PUD, r/o ACS/PNA; less likely colitis, no e/o SBO on exam. Labs, analgesia/antiemetic, EKG, POCUS, consider formal US/CT, UA. Danielle: 62F w/PMH inactive MS p/w epigastric pain; r/o biliary colic/cholecystitis, pancreatitis, consider gastritis/PUD, r/o ACS/PNA; less likely colitis, no e/o SBO on exam. Labs, analgesia/antiemetic, EKG, POCUS, consider formal US/CT, UA.  Attg: Pt presents with epigastric and midabdominal pain; pleuritic in nature; no sob; no fevers; seen at [facility] yesterday and had normal labs, troponin, dimer, cxr and ua; pt in ed with continued pain; on exam nad, lungs cta heart rrr, + epigastric tenderness, no le edema; r/o cholecystitis; obtain labs, lipase, ekg, us; if us negative ct, reassess

## 2017-08-07 NOTE — ED PROVIDER NOTE - ATTENDING CONTRIBUTION TO CARE
Attg: Pt presents with epigastric and midabdominal pain; pleuritic in nature; no sob; no fevers; seen at [facility] yesterday and had normal labs, troponin, dimer, cxr and ua; pt in ed with continued pain; on exam nad, lungs cta heart rrr, + epigastric tenderness, no le edema; r/o cholecystitis; obtain labs, lipase, ekg, us; if us negative ct, reassess

## 2017-08-07 NOTE — ED ADULT NURSE REASSESSMENT NOTE - NS ED NURSE REASSESS COMMENT FT1
pt tba for pericarditis. resting comfortably in stretcher. pending bed assignment. VS stable. safety maintained.

## 2017-08-07 NOTE — ED ADULT NURSE NOTE - OBJECTIVE STATEMENT
62 y.o female pmh MS seen and d/c at [place] ED yesterday for substernal chest discomfort c/o not feeling well, abdominal pain and new onset of fever last night. pt states she had EKG done and lab work yesterday which were all negative. pt states that last night she still did not feel well and is now c/o 7./10 generalized upper epigastric pain. no vomiting, weakness, dizziness, diarrhea, or burning upon urination. no fever upon ED arrival. took motrin yesterday , nothing for pain taken today. VS stable. denies cp, or weakness. verbalizes feeling tired. pt pending lab results and sono. safety maintained. sister at bedside.

## 2017-08-08 ENCOUNTER — TRANSCRIPTION ENCOUNTER (OUTPATIENT)
Age: 62
End: 2017-08-08

## 2017-08-08 VITALS
RESPIRATION RATE: 18 BRPM | DIASTOLIC BLOOD PRESSURE: 73 MMHG | SYSTOLIC BLOOD PRESSURE: 119 MMHG | TEMPERATURE: 99 F | OXYGEN SATURATION: 94 % | HEART RATE: 87 BPM

## 2017-08-08 DIAGNOSIS — I31.9 DISEASE OF PERICARDIUM, UNSPECIFIED: ICD-10-CM

## 2017-08-08 LAB
ALBUMIN SERPL ELPH-MCNC: 3.7 G/DL — SIGNIFICANT CHANGE UP (ref 3.3–5)
ALP SERPL-CCNC: 76 U/L — SIGNIFICANT CHANGE UP (ref 40–120)
ALT FLD-CCNC: 25 U/L — SIGNIFICANT CHANGE UP (ref 10–45)
ANION GAP SERPL CALC-SCNC: 12 MMOL/L — SIGNIFICANT CHANGE UP (ref 5–17)
AST SERPL-CCNC: 28 U/L — SIGNIFICANT CHANGE UP (ref 10–40)
BILIRUB SERPL-MCNC: 0.8 MG/DL — SIGNIFICANT CHANGE UP (ref 0.2–1.2)
BUN SERPL-MCNC: 7 MG/DL — SIGNIFICANT CHANGE UP (ref 7–23)
CALCIUM SERPL-MCNC: 9 MG/DL — SIGNIFICANT CHANGE UP (ref 8.4–10.5)
CHLORIDE SERPL-SCNC: 103 MMOL/L — SIGNIFICANT CHANGE UP (ref 96–108)
CK SERPL-CCNC: 74 U/L — SIGNIFICANT CHANGE UP (ref 25–170)
CO2 SERPL-SCNC: 23 MMOL/L — SIGNIFICANT CHANGE UP (ref 22–31)
CREAT SERPL-MCNC: 0.72 MG/DL — SIGNIFICANT CHANGE UP (ref 0.5–1.3)
GLUCOSE SERPL-MCNC: 80 MG/DL — SIGNIFICANT CHANGE UP (ref 70–99)
HCT VFR BLD CALC: 41.4 % — SIGNIFICANT CHANGE UP (ref 34.5–45)
HGB BLD-MCNC: 13.9 G/DL — SIGNIFICANT CHANGE UP (ref 11.5–15.5)
MCHC RBC-ENTMCNC: 33.1 PG — SIGNIFICANT CHANGE UP (ref 27–34)
MCHC RBC-ENTMCNC: 33.6 GM/DL — SIGNIFICANT CHANGE UP (ref 32–36)
MCV RBC AUTO: 98.6 FL — SIGNIFICANT CHANGE UP (ref 80–100)
PLATELET # BLD AUTO: 153 K/UL — SIGNIFICANT CHANGE UP (ref 150–400)
POTASSIUM SERPL-MCNC: 4.7 MMOL/L — SIGNIFICANT CHANGE UP (ref 3.5–5.3)
POTASSIUM SERPL-SCNC: 4.7 MMOL/L — SIGNIFICANT CHANGE UP (ref 3.5–5.3)
PROT SERPL-MCNC: 6.8 G/DL — SIGNIFICANT CHANGE UP (ref 6–8.3)
RBC # BLD: 4.2 M/UL — SIGNIFICANT CHANGE UP (ref 3.8–5.2)
RBC # FLD: 12.9 % — SIGNIFICANT CHANGE UP (ref 10.3–14.5)
SODIUM SERPL-SCNC: 138 MMOL/L — SIGNIFICANT CHANGE UP (ref 135–145)
TSH SERPL-MCNC: 2.71 UIU/ML — SIGNIFICANT CHANGE UP (ref 0.27–4.2)
WBC # BLD: 7.96 K/UL — SIGNIFICANT CHANGE UP (ref 3.8–10.5)
WBC # FLD AUTO: 7.96 K/UL — SIGNIFICANT CHANGE UP (ref 3.8–10.5)

## 2017-08-08 PROCEDURE — 99222 1ST HOSP IP/OBS MODERATE 55: CPT | Mod: GC

## 2017-08-08 PROCEDURE — 99223 1ST HOSP IP/OBS HIGH 75: CPT | Mod: 25

## 2017-08-08 PROCEDURE — 93306 TTE W/DOPPLER COMPLETE: CPT | Mod: 26

## 2017-08-08 PROCEDURE — 74182 MRI ABDOMEN W/CONTRAST: CPT | Mod: 26

## 2017-08-08 RX ORDER — COLCHICINE 0.6 MG
0.6 TABLET ORAL
Qty: 0 | Refills: 0 | Status: DISCONTINUED | OUTPATIENT
Start: 2017-08-08 | End: 2017-08-08

## 2017-08-08 RX ORDER — INTERFERON BETA-1A 22 UG/.5ML
0 INJECTION, SOLUTION SUBCUTANEOUS
Qty: 0 | Refills: 0 | COMMUNITY

## 2017-08-08 RX ORDER — IBUPROFEN 200 MG
1 TABLET ORAL
Qty: 0 | Refills: 0 | COMMUNITY

## 2017-08-08 RX ORDER — IBUPROFEN 200 MG
1 TABLET ORAL
Qty: 120 | Refills: 0 | OUTPATIENT
Start: 2017-08-08 | End: 2017-09-07

## 2017-08-08 RX ORDER — COLCHICINE 0.6 MG
1 TABLET ORAL
Qty: 60 | Refills: 0 | OUTPATIENT
Start: 2017-08-08 | End: 2017-09-07

## 2017-08-08 RX ADMIN — Medication 600 MILLIGRAM(S): at 06:47

## 2017-08-08 RX ADMIN — Medication 600 MILLIGRAM(S): at 18:28

## 2017-08-08 RX ADMIN — Medication 0.6 MILLIGRAM(S): at 18:28

## 2017-08-08 RX ADMIN — Medication 600 MILLIGRAM(S): at 14:05

## 2017-08-08 RX ADMIN — SODIUM CHLORIDE 3 MILLILITER(S): 9 INJECTION INTRAMUSCULAR; INTRAVENOUS; SUBCUTANEOUS at 06:47

## 2017-08-08 RX ADMIN — Medication 600 MILLIGRAM(S): at 05:58

## 2017-08-08 RX ADMIN — SODIUM CHLORIDE 3 MILLILITER(S): 9 INJECTION INTRAMUSCULAR; INTRAVENOUS; SUBCUTANEOUS at 13:12

## 2017-08-08 RX ADMIN — Medication 600 MILLIGRAM(S): at 13:15

## 2017-08-08 RX ADMIN — Medication 0.5 MILLIGRAM(S): at 09:37

## 2017-08-08 NOTE — DISCHARGE NOTE ADULT - PATIENT PORTAL LINK FT
“You can access the [facility] Patient Portal, offered by [facility], by registering with the following website: [email or web address]”

## 2017-08-08 NOTE — DISCHARGE NOTE ADULT - ADDITIONAL INSTRUCTIONS
Follow up with primary care physician within 1 week Follow up with primary care physician within 1 week ( Dr. Mejia)

## 2017-08-08 NOTE — DISCHARGE NOTE ADULT - MEDICATION SUMMARY - MEDICATIONS TO TAKE
I will START or STAY ON the medications listed below when I get home from the hospital:    ibuprofen 600 mg oral tablet  -- 1 tab(s) by mouth every 6 hours  -- Indication: For Pericarditis, unspecified chronicity, unspecified type    colchicine 0.6 mg oral tablet  -- 1 tab(s) by mouth 2 times a day  -- Indication: For Pericarditis, unspecified chronicity, unspecified type    Avonex  -- Inject 30 microgram(s) intramuscular once a week  -- Indication: For Disease of pericardium I will START or STAY ON the medications listed below when I get home from the hospital:    ibuprofen 600 mg oral tablet  -- 1 tab(s) by mouth every 6 hours  -- Indication: For Disease of pericardium    colchicine 0.6 mg oral tablet  -- 1 tab(s) by mouth 2 times a day  -- Indication: For Disease of pericardium    Avonex  -- Inject 30 microgram(s) intramuscular once a week  -- Indication: For Disease of pericardium

## 2017-08-08 NOTE — CONSULT NOTE ADULT - SUBJECTIVE AND OBJECTIVE BOX
Patient is a 62y old  Female who presents with a chief complaint of Chest pain with fever/chills x 2 days (07 Aug 2017 20:36)    HPI:  63 y/o female with inactive MS, [place] ED visit yesterday for same with neg trop, d-dimer, CXR p/w epigastric pain. Pain began after feeling chills yesterday at 12PM, "crushing" epigastric pain lasting for "a few hours" improved with toradol in ED. Also with subj fever temp measured to 100F. +nausea, no vomiting. Worse with leaning forward and lying back, also deep inspiration. No exertional sx. Normal BM yesterday (no d/melena/BRBPR). No hematuria, dysuria, prior hx gallstones/renal stones. Took 400mg ibuprofen yesterday to some effect. Noticed persistent pain upon awakening today. Drinks 1-2 glass wine a couple times a week, never smoker/drug use. No prior abd surgeries. (07 Aug 2017 20:28)    No sick contacts. No recent travel. No recent antibiotics.     REVIEW OF SYSTEMS  All as below unless noted otherwise    General:  Denies fever and chills. 	  Ophthalmologic: Denies any visual complaints. 	  ENMT: No throat pain.  Respiratory: No cough, sputum. No shortness of breath.   Cardiovascular: No chest pain.   Gastrointestinal: No nausea or vomiting. No abdominal pain or diarrhea.   Genitourinary: No burning urine, no frequency. No flank pain  Musculoskeletal: No joint swelling or pain.   Neurological: No confusion. 	  Skin: No rash    PAST MEDICAL & SURGICAL HISTORY:  Multiple sclerosis  No significant past surgical history    FAMILY HISTORY:    SOCIAL HISTORY:  non smoker      ANTIMICROBIALS:        OTHER MEDS:    ibuprofen  Tablet 600 milliGRAM(s) Oral every 6 hours  colchicine 0.6 milliGRAM(s) Oral daily  sodium chloride 0.9% lock flush 3 milliLiter(s) IV Push every 8 hours  ALPRAZolam 0.5 milliGRAM(s) Oral at bedtime PRN      Allergies  No Known Allergies      Vital Signs Last 24 Hrs  T(C): 36.7 (08 Aug 2017 04:03), Max: 39.3 (07 Aug 2017 19:37)  T(F): 98 (08 Aug 2017 04:03), Max: 102.7 (07 Aug 2017 19:37)  HR: 86 (08 Aug 2017 04:03) (84 - 97)  BP: 120/72 (08 Aug 2017 04:03) (119/70 - 150/83)  RR: 18 (08 Aug 2017 04:03) (18 - 18)  SpO2: 96% (08 Aug 2017 04:03) (96% - 98%)  Daily Height in cm: 167.64 (07 Aug 2017 22:33)    Daily Weight in k.2 (08 Aug 2017 08:22)        PHYSICAL EXAM:  patient in no acute respiratory distress.  Constitutional: Comfortable. Awake and alert  Eyes: PERRL EOMI. No discharge.  ENMT: No sinus tenderness.  No pharyngeal erythema.   Neck: Supple  Respiratory: Good air entry bilaterally, no wheezes, rhonchi, or crackles  Cardiovascular:S1 S2 wnl, No murmurs  Gastrointestinal: Soft, no tenderness , bowel sounds present,   Genitourinary: No suprapubic tenderness. no CVA tenderness   Musculoskeletal: No joint swelling. No edema.  Vascular: peripheral pulses felt  Neurological: No grossly focal deficits  Skin: No rash   Lymph Nodes: No palpable Lymphadenopathy   Psychiatric: Affect normal                            13.9   7.96  )-----------( 153      ( 08 Aug 2017 08:34 )             41.4     WBC Count: 7.96 ( @ 08:34)  WBC Count: 8.7 ( @ 11:14)  WBC Count: 13.0 ( @ 17:35)        138  |  103  |  7   ----------------------------<  80  4.7   |  23  |  0.72    Ca    9.0      08 Aug 2017 08:21    TPro  6.8  /  Alb  3.7  /  TBili  0.8  /  DBili  x   /  AST  28  /  ALT  25  /  AlkPhos  76  08-08    LIVER FUNCTIONS - ( 08 Aug 2017 08:21 )  Alb: 3.7 g/dL / Pro: 6.8 g/dL / ALK PHOS: 76 U/L / ALT: 25 U/L / AST: 28 U/L / GGT: x               CARDIAC MARKERS ( 08 Aug 2017 08:21 )  x     / x     / 74 U/L / x     / x      CARDIAC MARKERS ( 07 Aug 2017 11:14 )  x     / <0.01 ng/mL / x     / x     / 1.7 ng/mL  CARDIAC MARKERS ( 06 Aug 2017 17:35 )  <.015 ng/mL / x     / 85 U/L / x     / 2.2 ng/mL        Urinalysis Basic - ( 06 Aug 2017 18:42 )    Color: Yellow / Appearance: Clear / S.010 / pH: x  Gluc: x / Ketone: Negative  / Bili: Negative / Urobili: Negative   Blood: x / Protein: Negative / Nitrite: Negative   Leuk Esterase: Negative / RBC: x / WBC x   Sq Epi: x / Non Sq Epi: x / Bacteria: x          MICROBIOLOGY:  Culture - Urine (17 @ 00:23)    Specimen Source: .Urine Clean Catch (Midstream)    Culture Results:   <10,000 CFU/ml Normal Urogenital willy present    Culture - Blood (17 @ 00:10)    Specimen Source: .Blood Blood-Peripheral    Culture Results:   No growth to date.    Culture - Blood (17 @ 00:10)    Specimen Source: .Blood Blood-Peripheral    Culture Results:   No growth to date.      RADIOLOGY:    < from: CT Abdomen and Pelvis w/ Oral Cont and w/ IV Cont (17 @ 14:38) >  FINDINGS:    LOWER CHEST: Bilateral lower lobe atelectasis. Pericardium thickening.  Right inferior heart border pericardial cyst measuring 6.3 x 3 cm   unchanged.      LIVER: 5.1 x 1.6 cm right subhepatic collection previously 5.3 x 1.1 cm.  BILE DUCTS: Normal caliber.  GALLBLADDER: Within normal limits.  SPLEEN: Within normal limits.  PANCREAS: Within normal limits.  ADRENALS: 1.2 cm right adrenal nodule unchanged. Left adrenal gland   normal.  KIDNEYS/URETERS: Within normal limits.    BLADDER: Within normal limits.  REPRODUCTIVE ORGANS: 2.8 cm left ovarian cyst unchanged. Right ovary and   uterus normal.    BOWEL: No bowel obstruction. Appendix is normal  PERITONEUM: No ascites.  VESSELS:  Atherosclerotic changes  RETROPERITONEUM: No lymphadenopathy.    ABDOMINAL WALL: Small fat-containing umbilical hernia   BONES: Within normal limits.    IMPRESSION:     Right posterior subcapsular liver collection mildly increased in size of   uncertain significance.    Suggestion of pericarditis.    Pericardial cyst unchanged.    < from: US Echo Abdomen Limited (ED) (17 @ 11:40) >  IMPRESSION:Normal Gallbladder    < from: Xray Chest 2 Views PA/Lat (17 @ 17:44) >  IMPRESSION:    No active disease.    < end of copied text >      Echocardiogram: Patient is a 62y old  Female who presents with a chief complaint of Chest pain with fever/chills x 2 days (07 Aug 2017 20:36)    HPI:  61 y/o female with inactive MS p/w epigastric pain with fever and chills.   [place] ED visit 17 for same with neg trop, d-dimer, CXR.   Pain began after feeling chills, "crushing" epigastric pain lasting for "a few hours" improved with toradol in ED. Also with subj fever temp measured to 100F. +nausea, no vomiting. Worse with leaning forward and lying back, also deep inspiration. No exertional sx. Normal BM yesterday (no melena/BRBPR). No hematuria, dysuria, prior hx gallstones/renal stones. Took 400mg ibuprofen.   No sick contacts. No recent travel. No recent antibiotics.   Patient has been febrile-102.7 this admission with improvement of leukocytosis from 13 to 7.9.   Blood culture show no growth to date.Urine cultures show willy.       REVIEW OF SYSTEMS  All as below unless noted otherwise    General:  Denies fever and chills. 	  Ophthalmologic: Denies any visual complaints. 	  ENMT: No throat pain.  Respiratory: No cough, sputum. No shortness of breath.   Cardiovascular: No chest pain.   Gastrointestinal: No nausea or vomiting. No abdominal pain or diarrhea.   Genitourinary: No burning urine, no frequency. No flank pain  Musculoskeletal: No joint swelling or pain.   Neurological: No confusion. 	  Skin: No rash    PAST MEDICAL & SURGICAL HISTORY:  Multiple sclerosis  No significant past surgical history    FAMILY HISTORY: noncontributory    SOCIAL HISTORY:  non smoker  - Drinks 1-2 glass wine a couple times a week, never drug use.       ANTIMICROBIALS:  none      OTHER MEDS:    ibuprofen  Tablet 600 milliGRAM(s) Oral every 6 hours  colchicine 0.6 milliGRAM(s) Oral daily  sodium chloride 0.9% lock flush 3 milliLiter(s) IV Push every 8 hours  ALPRAZolam 0.5 milliGRAM(s) Oral at bedtime PRN      Allergies  No Known Allergies      Vital Signs Last 24 Hrs  T(C): 36.7 (08 Aug 2017 04:03), Max: 39.3 (07 Aug 2017 19:37)  T(F): 98 (08 Aug 2017 04:03), Max: 102.7 (07 Aug 2017 19:37)  HR: 86 (08 Aug 2017 04:03) (84 - 97)  BP: 120/72 (08 Aug 2017 04:03) (119/70 - 150/83)  RR: 18 (08 Aug 2017 04:03) (18 - 18)  SpO2: 96% (08 Aug 2017 04:03) (96% - 98%)  Daily Height in cm: 167.64 (07 Aug 2017 22:33)    Daily Weight in k.2 (08 Aug 2017 08:22)        PHYSICAL EXAM:  patient in no acute respiratory distress.  Constitutional: Comfortable. Awake and alert  Eyes: PERRL EOMI. No discharge.  ENMT: No sinus tenderness.  No pharyngeal erythema.   Neck: Supple  Respiratory: Good air entry bilaterally, no wheezes, rhonchi, or crackles  Cardiovascular:S1 S2 wnl, No murmurs  Gastrointestinal: Soft, no tenderness , bowel sounds present,   Genitourinary: No suprapubic tenderness. no CVA tenderness   Musculoskeletal: No joint swelling. No edema.  Vascular: peripheral pulses felt  Neurological: No grossly focal deficits  Skin: No rash   Lymph Nodes: No palpable Lymphadenopathy   Psychiatric: Affect normal                            13.9   7.96  )-----------( 153      ( 08 Aug 2017 08:34 )             41.4     WBC Count: 7.96 ( @ 08:34)  WBC Count: 8.7 ( @ 11:14)  WBC Count: 13.0 ( @ 17:35)        138  |  103  |  7   ----------------------------<  80  4.7   |  23  |  0.72    Ca    9.0      08 Aug 2017 08:21    TPro  6.8  /  Alb  3.7  /  TBili  0.8  /  DBili  x   /  AST  28  /  ALT  25  /  AlkPhos  76      LIVER FUNCTIONS - ( 08 Aug 2017 08:21 )  Alb: 3.7 g/dL / Pro: 6.8 g/dL / ALK PHOS: 76 U/L / ALT: 25 U/L / AST: 28 U/L / GGT: x               CARDIAC MARKERS ( 08 Aug 2017 08:21 )  x     / x     / 74 U/L / x     / x      CARDIAC MARKERS ( 07 Aug 2017 11:14 )  x     / <0.01 ng/mL / x     / x     / 1.7 ng/mL  CARDIAC MARKERS ( 06 Aug 2017 17:35 )  <.015 ng/mL / x     / 85 U/L / x     / 2.2 ng/mL        Urinalysis Basic - ( 06 Aug 2017 18:42 )    Color: Yellow / Appearance: Clear / S.010 / pH: x  Gluc: x / Ketone: Negative  / Bili: Negative / Urobili: Negative   Blood: x / Protein: Negative / Nitrite: Negative   Leuk Esterase: Negative / RBC: x / WBC x   Sq Epi: x / Non Sq Epi: x / Bacteria: x          MICROBIOLOGY:  Culture - Urine (17 @ 00:23)    Specimen Source: .Urine Clean Catch (Midstream)    Culture Results:   <10,000 CFU/ml Normal Urogenital willy present    Culture - Blood (17 @ 00:10)    Specimen Source: .Blood Blood-Peripheral    Culture Results:   No growth to date.    Culture - Blood (17 @ 00:10)    Specimen Source: .Blood Blood-Peripheral    Culture Results:   No growth to date.      RADIOLOGY:    < from: CT Abdomen and Pelvis w/ Oral Cont and w/ IV Cont (17 @ 14:38) >  FINDINGS:    LOWER CHEST: Bilateral lower lobe atelectasis. Pericardium thickening.  Right inferior heart border pericardial cyst measuring 6.3 x 3 cm   unchanged.      LIVER: 5.1 x 1.6 cm right subhepatic collection previously 5.3 x 1.1 cm.  BILE DUCTS: Normal caliber.  GALLBLADDER: Within normal limits.  SPLEEN: Within normal limits.  PANCREAS: Within normal limits.  ADRENALS: 1.2 cm right adrenal nodule unchanged. Left adrenal gland   normal.  KIDNEYS/URETERS: Within normal limits.    BLADDER: Within normal limits.  REPRODUCTIVE ORGANS: 2.8 cm left ovarian cyst unchanged. Right ovary and   uterus normal.    BOWEL: No bowel obstruction. Appendix is normal  PERITONEUM: No ascites.  VESSELS:  Atherosclerotic changes  RETROPERITONEUM: No lymphadenopathy.    ABDOMINAL WALL: Small fat-containing umbilical hernia   BONES: Within normal limits.    IMPRESSION:     Right posterior subcapsular liver collection mildly increased in size of   uncertain significance.    Suggestion of pericarditis.    Pericardial cyst unchanged.    < from: US Echo Abdomen Limited (ED) (17 @ 11:40) >  IMPRESSION:Normal Gallbladder    < from: Xray Chest 2 Views PA/Lat (17 @ 17:44) >  IMPRESSION:    No active disease.    < end of copied text >      Echocardiogram: Patient is a 62y old  Female who presents with a chief complaint of Chest pain with fever/chills x 2 days (07 Aug 2017 20:36)    HPI:  61 y/o female with inactive MS p/w epigastric pain with fever and chills.   [place] ED visit 17 for same with neg trop, d-dimer, CXR.   Pain began after feeling chills, "crushing" epigastric pain lasting for "a few hours" improved with toradol in ED. Also with subj fever temp measured to 100F. +nausea, no vomiting. Worse with leaning forward and lying back, also deep inspiration. No exertional sx. Normal BM yesterday (no melena/BRBPR). No hematuria, dysuria, prior hx gallstones/renal stones. Took 400mg ibuprofen.   No sick contacts. No recent travel. No recent antibiotics.   Patient has been febrile-102.7 this admission with improvement of leukocytosis from 13 to 7.9.   Blood culture show no growth to date. Urine cultures show willy.   She is feeling much better.       REVIEW OF SYSTEMS  All as below unless noted otherwise    General:  Denies fever and chills. 	  Ophthalmologic: Denies any visual complaints. 	  ENMT: No throat pain.  Respiratory: No cough, sputum. No shortness of breath.   Cardiovascular: No chest pain.   Gastrointestinal: No nausea or vomiting. No abdominal pain or diarrhea.   Genitourinary: No burning urine, no frequency. No flank pain  Musculoskeletal: No joint swelling or pain.   Neurological: No confusion. 	  Skin: No rash    PAST MEDICAL & SURGICAL HISTORY:  Multiple sclerosis  No significant past surgical history    FAMILY HISTORY: noncontributory    SOCIAL HISTORY:  non smoker  - Drinks 1-2 glass wine a couple times a week, never drug use.       ANTIMICROBIALS:  none      OTHER MEDS:    ibuprofen  Tablet 600 milliGRAM(s) Oral every 6 hours  colchicine 0.6 milliGRAM(s) Oral daily  sodium chloride 0.9% lock flush 3 milliLiter(s) IV Push every 8 hours  ALPRAZolam 0.5 milliGRAM(s) Oral at bedtime PRN      Allergies  No Known Allergies      Vital Signs Last 24 Hrs  T(C): 36.7 (08 Aug 2017 04:03), Max: 39.3 (07 Aug 2017 19:37)  T(F): 98 (08 Aug 2017 04:03), Max: 102.7 (07 Aug 2017 19:37)  HR: 86 (08 Aug 2017 04:03) (84 - 97)  BP: 120/72 (08 Aug 2017 04:03) (119/70 - 150/83)  RR: 18 (08 Aug 2017 04:03) (18 - 18)  SpO2: 96% (08 Aug 2017 04:03) (96% - 98%)  Daily Height in cm: 167.64 (07 Aug 2017 22:33)    Daily Weight in k.2 (08 Aug 2017 08:22)        PHYSICAL EXAM:  patient in no acute respiratory distress.  Constitutional: Comfortable. Awake and alert  Eyes: PERRL EOMI. No discharge.  ENMT: No sinus tenderness.  No pharyngeal erythema.   Neck: Supple  Respiratory: Good air entry bilaterally, no wheezes, rhonchi, or crackles  Cardiovascular:S1 S2 wnl, No murmurs  Gastrointestinal: Soft, no tenderness , bowel sounds present,   Genitourinary: No suprapubic tenderness. no CVA tenderness   Musculoskeletal: No joint swelling. No edema.  Vascular: peripheral pulses felt  Neurological: No grossly focal deficits  Skin: No rash   Lymph Nodes: No palpable Lymphadenopathy   Psychiatric: Affect normal                            13.9   7.96  )-----------( 153      ( 08 Aug 2017 08:34 )             41.4     WBC Count: 7.96 ( @ 08:34)  WBC Count: 8.7 ( @ 11:14)  WBC Count: 13.0 ( @ 17:35)        138  |  103  |  7   ----------------------------<  80  4.7   |  23  |  0.72    Ca    9.0      08 Aug 2017 08:21    TPro  6.8  /  Alb  3.7  /  TBili  0.8  /  DBili  x   /  AST  28  /  ALT  25  /  AlkPhos  76      LIVER FUNCTIONS - ( 08 Aug 2017 08:21 )  Alb: 3.7 g/dL / Pro: 6.8 g/dL / ALK PHOS: 76 U/L / ALT: 25 U/L / AST: 28 U/L / GGT: x               CARDIAC MARKERS ( 08 Aug 2017 08:21 )  x     / x     / 74 U/L / x     / x      CARDIAC MARKERS ( 07 Aug 2017 11:14 )  x     / <0.01 ng/mL / x     / x     / 1.7 ng/mL  CARDIAC MARKERS ( 06 Aug 2017 17:35 )  <.015 ng/mL / x     / 85 U/L / x     / 2.2 ng/mL        Urinalysis Basic - ( 06 Aug 2017 18:42 )    Color: Yellow / Appearance: Clear / S.010 / pH: x  Gluc: x / Ketone: Negative  / Bili: Negative / Urobili: Negative   Blood: x / Protein: Negative / Nitrite: Negative   Leuk Esterase: Negative / RBC: x / WBC x   Sq Epi: x / Non Sq Epi: x / Bacteria: x          MICROBIOLOGY:  Culture - Urine (17 @ 00:23)    Specimen Source: .Urine Clean Catch (Midstream)    Culture Results:   <10,000 CFU/ml Normal Urogenital willy present    Culture - Blood (17 @ 00:10)    Specimen Source: .Blood Blood-Peripheral    Culture Results:   No growth to date.    Culture - Blood (17 @ 00:10)    Specimen Source: .Blood Blood-Peripheral    Culture Results:   No growth to date.      RADIOLOGY:    < from: CT Abdomen and Pelvis w/ Oral Cont and w/ IV Cont (17 @ 14:38) >  FINDINGS:    LOWER CHEST: Bilateral lower lobe atelectasis. Pericardium thickening.  Right inferior heart border pericardial cyst measuring 6.3 x 3 cm   unchanged.      LIVER: 5.1 x 1.6 cm right subhepatic collection previously 5.3 x 1.1 cm.  BILE DUCTS: Normal caliber.  GALLBLADDER: Within normal limits.  SPLEEN: Within normal limits.  PANCREAS: Within normal limits.  ADRENALS: 1.2 cm right adrenal nodule unchanged. Left adrenal gland   normal.  KIDNEYS/URETERS: Within normal limits.    BLADDER: Within normal limits.  REPRODUCTIVE ORGANS: 2.8 cm left ovarian cyst unchanged. Right ovary and   uterus normal.    BOWEL: No bowel obstruction. Appendix is normal  PERITONEUM: No ascites.  VESSELS:  Atherosclerotic changes  RETROPERITONEUM: No lymphadenopathy.    ABDOMINAL WALL: Small fat-containing umbilical hernia   BONES: Within normal limits.    IMPRESSION:     Right posterior subcapsular liver collection mildly increased in size of   uncertain significance.    Suggestion of pericarditis.    Pericardial cyst unchanged.    < from: US Echo Abdomen Limited (ED) (17 @ 11:40) >  IMPRESSION:Normal Gallbladder    < from: Xray Chest 2 Views PA/Lat (17 @ 17:44) >  IMPRESSION:    No active disease.    < end of copied text >      Echocardiogram:

## 2017-08-08 NOTE — PROGRESS NOTE ADULT - SUBJECTIVE AND OBJECTIVE BOX
[facility] Resident Progress Note    S: Patient (-) flatus, (-) BM; denies fevers, chills, nausea, emesis, chest pain, SOB.    O: Vital Signs Last 24 Hrs  T(C): 36.7 (08 Aug 2017 04:03), Max: 39.3 (07 Aug 2017 19:37)  T(F): 98 (08 Aug 2017 04:03), Max: 102.7 (07 Aug 2017 19:37)  HR: 86 (08 Aug 2017 04:03) (84 - 97)  BP: 120/72 (08 Aug 2017 04:03) (119/70 - 150/83)  RR: 18 (08 Aug 2017 04:03) (18 - 20)  SpO2: 96% (08 Aug 2017 04:03) (96% - 98%)    I&O's Detail    07 Aug 2017 07:01  -  08 Aug 2017 04:19  --------------------------------------------------------  IN:    Oral Fluid: 100 mL  Total IN: 100 mL    OUT:  Total OUT: 0 mL    Total NET: 100 mL        General: alert and oriented, NAD  Resp: airway patent, respirations unlabored  CVS: regular rate and rhythm  Abdomen: soft, nontender, nondistended  Extremities: no edema  Skin: warm, dry, appropriate color                          14.4   8.7   )-----------( 154      ( 07 Aug 2017 11:14 )             42.4   08-07    138  |  102  |  9   ----------------------------<  99  4.2   |  25  |  0.55    Ca    8.8      07 Aug 2017 11:14    TPro  6.7  /  Alb  3.9  /  TBili  0.7  /  DBili  x   /  AST  26  /  ALT  30  /  AlkPhos  75  08-07    Urinalysis (08.06.17 @ 18:42)    Glucose Qualitative, Urine: Negative    pH Urine: 6.0    Blood, Urine: Negative    Color: Yellow    Urine Appearance: Clear    Bilirubin: Negative    Ketone - Urine: Negative    Specific Gravity: 1.010    Protein, Urine: Negative    Urobilinogen: Negative    Nitrite: Negative    Leukocyte Esterase Concentration: Negative    Culture - Urine (08.07.17 @ 00:23)    Specimen Source: .Urine Clean Catch (Midstream)    Culture Results:   <10,000 CFU/ml Normal Urogenital willy present    Culture - Blood (08.07.17 @ 00:10)    Specimen Source: .Blood Blood-Peripheral    Culture Results:   No growth to date.    Imaging Reviewed:  CT Abdomen and Pelvis w/ Oral Cont and w/ IV Cont (08.07.17 @ 14:38): Right posterior subcapsular liver collection mildly increased in size of uncertain significance. Suggestion of pericarditis. Pericardial cyst unchanged.

## 2017-08-08 NOTE — CONSULT NOTE ADULT - SUBJECTIVE AND OBJECTIVE BOX
GENERAL SURGERY CONSULT NOTE     CC: 62y old Female admitted with a chief complaint of Chest pain with fever/chills x 2 days.    HPI: This patient is a 62y old Female presenting with 2 days of epigastric pain. On day prior to presentation , pt presented to [facility] ED with crushing epigastric pain, fever and chills that were relieved with Toradol.  At [place], cardiac workup and CXR were negative and the pt was discharged with NSAID pain control.  Pt presented to [facility] today feeling like she wasn't properly treated at [place]. At presentation, pt states the pain in minimal and intermittent exacerbated by deep inspiration and leaning forward, and palliated by laying supine.  She denies any dizziness, fevers, chills, n/v/d, CP, SOB, abd pain.    PAST MEDICAL & SURGICAL HISTORY:  Multiple sclerosis  No significant past surgical history    Medications (home): Medications (inpatient): ibuprofen  Tablet 600 milliGRAM(s) Oral every 6 hours  colchicine 0.6 milliGRAM(s) Oral daily  sodium chloride 0.9% lock flush 3 milliLiter(s) IV Push every 8 hours    Medications (PRN):ALPRAZolam 0.5 milliGRAM(s) Oral at bedtime PRN    Allergies  No Known Allergies    Social Hx:   Pt denies tobacco smoking.    Physical Exam  T(C): 36.4 (17 @ 22:33), Max: 39.3 (17 @ 19:37)  HR: 97 (17 @ 22:33) (84 - 97)  BP: 119/70 (17 @ 22:33) (119/70 - 150/83)  RR: 18 (17 @ 22:33) (18 - 20)  SpO2: 97% (17 @ 22:33) (96% - 98%)  Tmax: T(C): , Max: 39.3 (17 @ 19:37)    General: well developed, well nourished, NAD  Neuro: alert and oriented, no focal deficits, moves all extremities spontaneously  HEENT: NCAT, EOMI, anicteric, mucosa moist  Respiratory: respirations unlabored, cta b/l  CVS: rrr, s1+, s2+, no m/r/g  Abdomen: soft, nontender, nondistended  Extremities: no edema, sensation and movement grossly intact  Skin: warm, dry, appropriate color    Labs:                        14.4   8.7   )-----------( 154      ( 07 Aug 2017 11:14 )             42.4           138  |  102  |  9   ----------------------------<  99  4.2   |  25  |  0.55    Ca    8.8      07 Aug 2017 11:14    TPro  6.7  /  Alb  3.9  /  TBili  0.7  /  DBili  x   /  AST  26  /  ALT  30  /  AlkPhos  75      Urinalysis Basic - ( 06 Aug 2017 18:42 )    Color: Yellow / Appearance: Clear / S.010 / pH: x  Gluc: x / Ketone: Negative  / Bili: Negative / Urobili: Negative   Blood: x / Protein: Negative / Nitrite: Negative   Leuk Esterase: Negative / RBC: x / WBC x   Sq Epi: x / Non Sq Epi: x / Bacteria: x    Imaging and other studies:    CT Abdomen and Pelvis w/ Oral Cont and w/ IV Cont (17 @ 14:38): Right posterior subcapsular liver collection mildly increased in size of uncertain significance. Suggestion of pericarditis. Pericardial cyst unchanged.    Xray Chest 2 Views PA/Lat (17 @ 17:44): No active disease.    US Echo Abdomen Limited (ED) (17 @ 11:40): Normal Gallbladder

## 2017-08-08 NOTE — CONSULT NOTE ADULT - ASSESSMENT
Patient is a 62 year old female presenting with subxiphoid/epigastric pain found to have cystic lesions and a subhepatic lesion which have showed no improvement over the past 4 months.
61 y/o female with inactive MS p/w epigastric pain with fever 102.7 and chills. Imaging found pericardial cyst with pericarditis and subhepatic cyst.     1- Pericarditis  - check RVP  - patient already on colchicine and NSAIDS  - F/u ECHO    2- Subhepatic and pericardial cyst   - could be infected   - fever and leukocytosis improving on NSAIDS  - monitor off antibiotics
62F p/w epigastric pain likely secondary to pericarditis with perihepatic fluid collection demonstrated on imaging. Pt is afebrile, hemodynamically stable and without complaints.    - admit to medicine for evaluation and management of pericarditis.  - pain control with NSAIDs  - given non-specific clinical significance, no acute surgical management necessary at this time for perihepatic fluid collection  - upon initial discovery of this hepatic collection, patient was told to get an MRI as an outpatient which she failed to comply with.    - Inpatient MRI for workup of perihepatic fluid collection and would c/s hepatobiliary service with results.  - discussed with  Attending SAVAGE Thomas MD [facility]
Very pleasant 62 year-old woman admitted with symptoms concerning for pericarditis.  Patient currently afebrile with negative enzymes and symptoms that have resolved with NSAID.    No signs or symptoms of tamponade.  Check TTE to establish baseline effusion size.  Continue NSAID for symptomatic relief.  Colchicine 0.6 mg PO BID for three months to prevent recurrence.    After TTE, no cardiovascular contraindication to discharge planning per primary team.

## 2017-08-08 NOTE — DISCHARGE NOTE ADULT - CARE PLAN
Principal Discharge DX:	Pericarditis, unspecified chronicity, unspecified type  Goal:	Resolution of symptom  Instructions for follow-up, activity and diet:	continue taking Motrin and Colchicine

## 2017-08-08 NOTE — CONSULT NOTE ADULT - SUBJECTIVE AND OBJECTIVE BOX
HEPATOBILIARY SURGERY CONSULT NOTE     CC: 62y old Female admitted with a chief complaint of Chest pain with fever/chills x 2 days.    HPI: This patient is a 62y old Female presenting with 2 days of epigastric pain. On day prior to presentation , pt presented to [facility] ED with crushing epigastric pain, fever and chills that were relieved with Toradol.  At [place], cardiac workup and CXR were negative and the pt was discharged with NSAID pain control.  Pt presented to [facility] today feeling like she wasn't properly treated at [place]. At presentation, pt states the pain in minimal and intermittent exacerbated by deep inspiration and leaning forward, and palliated by laying supine.  She denies any dizziness, fevers, chills, n/v/d, CP, SOB, abd pain.    PAST MEDICAL & SURGICAL HISTORY:  Multiple sclerosis  No significant past surgical history    Medications (home): Medications (inpatient): ibuprofen  Tablet 600 milliGRAM(s) Oral every 6 hours  colchicine 0.6 milliGRAM(s) Oral daily  sodium chloride 0.9% lock flush 3 milliLiter(s) IV Push every 8 hours    Medications (PRN):ALPRAZolam 0.5 milliGRAM(s) Oral at bedtime PRN    Allergies  No Known Allergies    Social Hx:   Pt denies tobacco smoking.    Physical Exam  T(C): 36.4 (17 @ 22:33), Max: 39.3 (17 @ 19:37)  HR: 97 (17 @ 22:33) (84 - 97)  BP: 119/70 (17 @ 22:33) (119/70 - 150/83)  RR: 18 (17 @ 22:33) (18 - 20)  SpO2: 97% (17 @ 22:33) (96% - 98%)  Tmax: T(C): , Max: 39.3 (17 @ 19:37)    General: well developed, well nourished, NAD  Neuro: alert and oriented, no focal deficits, moves all extremities spontaneously  HEENT: NCAT, EOMI, anicteric, mucosa moist  Respiratory: respirations unlabored, cta b/l  CVS: rrr, s1+, s2+, no m/r/g  Abdomen: soft, nontender, nondistended  Extremities: no edema, sensation and movement grossly intact  Skin: warm, dry, appropriate color    Labs:                        14.4   8.7   )-----------( 154      ( 07 Aug 2017 11:14 )             42.4           138  |  102  |  9   ----------------------------<  99  4.2   |  25  |  0.55    Ca    8.8      07 Aug 2017 11:14    TPro  6.7  /  Alb  3.9  /  TBili  0.7  /  DBili  x   /  AST  26  /  ALT  30  /  AlkPhos  75      Urinalysis Basic - ( 06 Aug 2017 18:42 )    Color: Yellow / Appearance: Clear / S.010 / pH: x  Gluc: x / Ketone: Negative  / Bili: Negative / Urobili: Negative   Blood: x / Protein: Negative / Nitrite: Negative   Leuk Esterase: Negative / RBC: x / WBC x   Sq Epi: x / Non Sq Epi: x / Bacteria: x    Imaging and other studies:    CT Abdomen and Pelvis w/ Oral Cont and w/ IV Cont (17 @ 14:38): Right posterior subcapsular liver collection mildly increased in size of uncertain significance. Suggestion of pericarditis. Pericardial cyst unchanged.    Xray Chest 2 Views PA/Lat (17 @ 17:44): No active disease.    US Echo Abdomen Limited (ED) (17 @ 11:40): Normal Gallbladder

## 2017-08-08 NOTE — CONSULT NOTE ADULT - ATTENDING COMMENTS
61 yo F with MS initially presenting to outside hospital with epigastric pain. Suspected pericarditis as per cardiology. Patient initial episode of fever. PCT was negative, WBC resolved without antibiotics, does not appears as bacterial infection. Blood cultures negative. Uncertain etiology of pericarditis, patient does not appear to have risk factors for atypicals (such as TB). Patient states that liver lesion was previously known, does not have any abd pain, no N/V/D. No other complaints. Await MRI.  - Continue off antibiotics  - Follow up MRI for better characterization of liver lesion  - Follow up TTE      Robbin Soriano MD  Pager [phone]  After 5pm and on weekends call [phone]
Pt seen and examined  Agree with note which was reviewed and edited where appropriate.  D/W patient, & residents    Consider aspiration of perihepatic fluid if clinically significant.

## 2017-08-08 NOTE — CONSULT NOTE ADULT - SUBJECTIVE AND OBJECTIVE BOX
Patient seen and evaluated @ 7:30am on 4 MON  Chief Complaint: Positional chest and epigastric discomfort    HPI:  62 year-old woman w/PMH inactive MS, [place] ED visit two days ago with epigastric discomfort, found to have neg trop, d-dimer, CXR. Pain began after feeling chills Sunday, 8/6/2017 at 12PM, "crushing" epigastric pain lasting for "a few hours" improved with toradol in ED. Also with subj fever temp measured to 100F. +nausea, no vomiting. Worse with leaning forward and lying back, also deep inspiration. No exertional sx. Normal BM yesterday (no d/melena/BRBPR). No hematuria, dysuria, prior hx gallstones/renal stones. Took 400mg ibuprofen yesterday to some effect. Noticed persistent pain upon awakening today. Drinks 1-2 glass wine a couple times a week, never smoker/drug use. No prior abd surgeries. (07 Aug 2017 20:28)    PMH:   Multiple sclerosis    PSH:   No significant past surgical history    Medications:   ibuprofen  Tablet 600 milliGRAM(s) Oral every 6 hours  colchicine 0.6 milliGRAM(s) Oral daily  sodium chloride 0.9% lock flush 3 milliLiter(s) IV Push every 8 hours  ALPRAZolam 0.5 milliGRAM(s) Oral at bedtime PRN    Allergies:  No Known Allergies    FAMILY HISTORY: reviewed and noncontributory    Social History: , lives with daughter (who is going off to college this week); working as RN  Smoking: nonsmoker  Alcohol: occasional  Drugs: none    Review of Systems:    Constitutional: No fever, chills, fatigue, or changes in weight  HEENT: No blurry vision, eye pain, headache, runny nose, or sore throat  Respiratory: No shortness of breath, cough, or wheezing  Cardiovascular: No chest pain or palpitations  Gastrointestinal: No nausea, vomiting, diarrhea, or abdominal pain  Genitourinary: No dysuria or incontinence  Extremities: No lower extremity swelling  Neurologic: No focal findings  Lymphatic: No lymphadenopathy   Skin: No rash  Psychiatry: No anxiety or depression  10 point review of systems is otherwise negative except as mentioned above            Physical Exam:  T(C): 36.7 (08-08-17 @ 04:03), Max: 39.3 (08-07-17 @ 19:37)  HR: 86 (08-08-17 @ 04:03) (84 - 97)  BP: 120/72 (08-08-17 @ 04:03) (119/70 - 150/83)  RR: 18 (08-08-17 @ 04:03) (18 - 20)  SpO2: 96% (08-08-17 @ 04:03) (96% - 98%)  Wt(kg): --    08-07 @ 07:01  -  08-08 @ 07:00  --------------------------------------------------------  IN: 220 mL / OUT: 0 mL / NET: 220 mL      Daily Height in cm: 167.64 (07 Aug 2017 22:33)    Daily     Appearance: Normal, well groomed, NAD  Eyes: PERRLA, EOMI, pink conjunctiva, no scleral icterus   HENT: Normal oral mucosa  Cardiovascular: RRR, S1, S2, no murmur, rub, or gallop; no edema; no JVD  Procedural Access Site: Clean, dry, intact, without hematoma  Respiratory: Clear to auscultation bilaterally  Gastrointestinal: Soft, non-tender, non-distended, BS+  Musculoskeletal: No clubbing or joint deformity   Neurologic: No focal weakness  Lymphatic: No lymphadenopathy  Psychiatry: AAOx3 with appropriate mood and affect  Skin: No rashes, ecchymoses, or cyanosis    Cardiovascular Diagnostic Testing:  ECG: sinus 84 bpm with left atrial enlargement and IVCD    Interpretation of Telemetry: NSR    Imaging: ECHO PENDING    Labs:                        14.4   8.7   )-----------( 154      ( 07 Aug 2017 11:14 )             42.4     08-07    138  |  102  |  9   ----------------------------<  99  4.2   |  25  |  0.55    Ca    8.8      07 Aug 2017 11:14    TPro  6.7  /  Alb  3.9  /  TBili  0.7  /  DBili  x   /  AST  26  /  ALT  30  /  AlkPhos  75  08-07      CARDIAC MARKERS ( 07 Aug 2017 11:14 )  x     / <0.01 ng/mL / x     / x     / 1.7 ng/mL  CARDIAC MARKERS ( 06 Aug 2017 17:35 )  <.015 ng/mL / x     / 85 U/L / x     / 2.2 ng/mL

## 2017-08-08 NOTE — PROGRESS NOTE ADULT - ASSESSMENT
62F p/w epigastric pain likely secondary to pericarditis with perihepatic fluid collection demonstrated on imaging. Pt is afebrile, hemodynamically stable and without complaints.    - pain control with NSAIDs  - given non-specific clinical significance, no acute surgical management necessary at this time for perihepatic fluid collection  - upon initial discovery of this hepatic collection, patient was told to get an MRI as an outpatient which she failed to comply with.    - Inpatient MRI for workup of perihepatic fluid collection and would c/s hepatobiliary service with results.  - discussed with  Attending D Myla Thomas MD [facility]

## 2017-08-08 NOTE — DISCHARGE NOTE ADULT - HOSPITAL COURSE
MD 62F w/PMH inactive MS, [place] ED visit yesterday for same with neg trop, d-dimer, CXR p/w epigastric pain. Pain began after feeling chills yesterday at 12PM, "crushing" epigastric pain lasting for "a few hours" improved with toradol in ED. Also with subj fever temp measured to 100F. +nausea, no vomiting. Worse with leaning forward and lying back, also deep inspiration. No exertional sx. Normal BM yesterday (no d/melena/BRBPR). No hematuria, dysuria, prior hx gallstones/renal stones. Took 400mg ibuprofen yesterday to some effect. Noticed persistent pain upon awakening today. Drinks 1-2 glass wine a couple times a week, never smoker/drug use. No prior abd surgeries.	    evaluated by cards / ID     No signs or symptoms of tamponade.  TTE - NL   Continue NSAID for symptomatic relief.  Colchicine 0.6 mg PO BID for three months to prevent recurrence.  DCed pt after cards  clearance

## 2017-08-09 RX ORDER — IBUPROFEN 200 MG
1 TABLET ORAL
Qty: 120 | Refills: 0
Start: 2017-08-09 | End: 2017-09-08

## 2017-08-09 RX ORDER — COLCHICINE 0.6 MG
1 TABLET ORAL
Qty: 60 | Refills: 0
Start: 2017-08-09 | End: 2017-09-08

## 2017-08-12 LAB
CULTURE RESULTS: SIGNIFICANT CHANGE UP
CULTURE RESULTS: SIGNIFICANT CHANGE UP
SPECIMEN SOURCE: SIGNIFICANT CHANGE UP
SPECIMEN SOURCE: SIGNIFICANT CHANGE UP

## 2017-10-10 ENCOUNTER — LABORATORY RESULT (OUTPATIENT)
Age: 62
End: 2017-10-10

## 2017-10-10 ENCOUNTER — APPOINTMENT (OUTPATIENT)
Dept: RHEUMATOLOGY | Facility: CLINIC | Age: 62
End: 2017-10-10
Payer: MEDICAID

## 2017-10-10 VITALS
TEMPERATURE: 98.8 F | SYSTOLIC BLOOD PRESSURE: 151 MMHG | DIASTOLIC BLOOD PRESSURE: 80 MMHG | BODY MASS INDEX: 20.99 KG/M2 | WEIGHT: 126 LBS | HEART RATE: 91 BPM | OXYGEN SATURATION: 96 % | HEIGHT: 65 IN

## 2017-10-10 DIAGNOSIS — I31.9 DISEASE OF PERICARDIUM, UNSPECIFIED: ICD-10-CM

## 2017-10-10 DIAGNOSIS — Z78.9 OTHER SPECIFIED HEALTH STATUS: ICD-10-CM

## 2017-10-10 DIAGNOSIS — Z82.61 FAMILY HISTORY OF ARTHRITIS: ICD-10-CM

## 2017-10-10 DIAGNOSIS — R76.8 OTHER SPECIFIED ABNORMAL IMMUNOLOGICAL FINDINGS IN SERUM: ICD-10-CM

## 2017-10-10 LAB
ALBUMIN SERPL ELPH-MCNC: 4.3 G/DL
ALP BLD-CCNC: 96 U/L
ALT SERPL-CCNC: 29 U/L
ANION GAP SERPL CALC-SCNC: 16 MMOL/L
APPEARANCE: CLEAR
APTT BLD: 29.3 SEC
AST SERPL-CCNC: 24 U/L
BACTERIA: NEGATIVE
BASOPHILS # BLD AUTO: 0.02 K/UL
BASOPHILS NFR BLD AUTO: 0.2 %
BILIRUB SERPL-MCNC: 0.4 MG/DL
BILIRUBIN URINE: NEGATIVE
BLOOD URINE: NEGATIVE
BUN SERPL-MCNC: 16 MG/DL
CALCIUM SERPL-MCNC: 9.5 MG/DL
CHLORIDE SERPL-SCNC: 101 MMOL/L
CO2 SERPL-SCNC: 25 MMOL/L
COLOR: YELLOW
CREAT SERPL-MCNC: 0.67 MG/DL
CREAT SPEC-SCNC: 56 MG/DL
CREAT/PROT UR: 0.1 RATIO
CRP SERPL-MCNC: 0.7 MG/DL
EOSINOPHIL # BLD AUTO: 0.04 K/UL
EOSINOPHIL NFR BLD AUTO: 0.4 %
ERYTHROCYTE [SEDIMENTATION RATE] IN BLOOD BY WESTERGREN METHOD: 19 MM/HR
GLUCOSE QUALITATIVE U: NEGATIVE MG/DL
GLUCOSE SERPL-MCNC: 101 MG/DL
HCT VFR BLD CALC: 39.3 %
HGB BLD-MCNC: 13.5 G/DL
HYALINE CASTS: 2 /LPF
IMM GRANULOCYTES NFR BLD AUTO: 0.3 %
KETONES URINE: NEGATIVE
LEUKOCYTE ESTERASE URINE: NEGATIVE
LYMPHOCYTES # BLD AUTO: 2.45 K/UL
LYMPHOCYTES NFR BLD AUTO: 24.4 %
MAN DIFF?: NORMAL
MCHC RBC-ENTMCNC: 33.8 PG
MCHC RBC-ENTMCNC: 34.4 GM/DL
MCV RBC AUTO: 98.5 FL
MICROSCOPIC-UA: NORMAL
MONOCYTES # BLD AUTO: 0.74 K/UL
MONOCYTES NFR BLD AUTO: 7.4 %
NEUTROPHILS # BLD AUTO: 6.78 K/UL
NEUTROPHILS NFR BLD AUTO: 67.3 %
NITRITE URINE: NEGATIVE
PH URINE: 6
PLATELET # BLD AUTO: 236 K/UL
POTASSIUM SERPL-SCNC: 4.5 MMOL/L
PROT SERPL-MCNC: 7 G/DL
PROT UR-MCNC: 6 MG/DL
PROTEIN URINE: NEGATIVE MG/DL
RBC # BLD: 3.99 M/UL
RBC # FLD: 14.5 %
RED BLOOD CELLS URINE: 0 /HPF
RHEUMATOID FACT SER QL: <7 IU/ML
SODIUM SERPL-SCNC: 142 MMOL/L
SPECIFIC GRAVITY URINE: 1.01
SQUAMOUS EPITHELIAL CELLS: 1 /HPF
TSH SERPL-ACNC: 1.86 UIU/ML
URATE SERPL-MCNC: 4.2 MG/DL
UROBILINOGEN URINE: NEGATIVE MG/DL
WBC # FLD AUTO: 10.06 K/UL
WHITE BLOOD CELLS URINE: 1 /HPF

## 2017-10-10 PROCEDURE — 99204 OFFICE O/P NEW MOD 45 MIN: CPT

## 2017-10-11 LAB
25(OH)D3 SERPL-MCNC: 17.9 NG/ML
ANA SER IF-ACNC: NEGATIVE
CCP AB SER IA-ACNC: <8 UNITS
CENTROMERE IGG SER-ACNC: <0.2 AL
DSDNA AB SER-ACNC: <12 IU/ML
ENA RNP AB SER IA-ACNC: <0.2 AL
ENA SCL70 IGG SER IA-ACNC: <0.2 AL
ENA SM AB SER IA-ACNC: <0.2 AL
ENA SS-A AB SER IA-ACNC: <0.2 AL
ENA SS-B AB SER IA-ACNC: <0.2 AL
HAV IGM SER QL: NONREACTIVE
HBV CORE IGG+IGM SER QL: NONREACTIVE
HBV CORE IGM SER QL: NONREACTIVE
HBV SURFACE AG SER QL: NONREACTIVE
HCV AB SER QL: NONREACTIVE
HCV S/CO RATIO: 0.15 S/CO
RF+CCP IGG SER-IMP: NEGATIVE
THYROGLOB AB SERPL-ACNC: <20 IU/ML
THYROPEROXIDASE AB SERPL IA-ACNC: <10 IU/ML

## 2017-10-12 PROBLEM — I31.9 PERICARDITIS: Status: ACTIVE | Noted: 2017-10-10

## 2017-10-12 LAB
ADJUSTED MITOGEN: 7.43 IU/ML
ADJUSTED TB AG: 0 IU/ML
C3 SERPL-MCNC: 125 MG/DL
C4 SERPL-MCNC: 29 MG/DL
M TB IFN-G BLD-IMP: NEGATIVE
QUANTIFERON GOLD NIL: 0.03 IU/ML
RNA POLYMERASE III IGG: <10 U

## 2017-10-13 LAB
B2 GLYCOPROT1 AB SER QL: NEGATIVE
CARDIOLIPIN AB SER IA-ACNC: POSITIVE
G6PD SER-CCNC: 10.3 U/G HB

## 2017-10-17 DIAGNOSIS — Z13.820 ENCOUNTER FOR SCREENING FOR OSTEOPOROSIS: ICD-10-CM

## 2017-10-17 DIAGNOSIS — E55.9 VITAMIN D DEFICIENCY, UNSPECIFIED: ICD-10-CM

## 2017-12-15 PROCEDURE — 82435 ASSAY OF BLOOD CHLORIDE: CPT

## 2017-12-15 PROCEDURE — 93306 TTE W/DOPPLER COMPLETE: CPT

## 2017-12-15 PROCEDURE — 96374 THER/PROPH/DIAG INJ IV PUSH: CPT | Mod: XU

## 2017-12-15 PROCEDURE — 82947 ASSAY GLUCOSE BLOOD QUANT: CPT

## 2017-12-15 PROCEDURE — 93005 ELECTROCARDIOGRAM TRACING: CPT

## 2017-12-15 PROCEDURE — 83690 ASSAY OF LIPASE: CPT

## 2017-12-15 PROCEDURE — 76705 ECHO EXAM OF ABDOMEN: CPT

## 2017-12-15 PROCEDURE — A9585: CPT

## 2017-12-15 PROCEDURE — 74182 MRI ABDOMEN W/CONTRAST: CPT

## 2017-12-15 PROCEDURE — 84132 ASSAY OF SERUM POTASSIUM: CPT

## 2017-12-15 PROCEDURE — 82330 ASSAY OF CALCIUM: CPT

## 2017-12-15 PROCEDURE — 85014 HEMATOCRIT: CPT

## 2017-12-15 PROCEDURE — 85027 COMPLETE CBC AUTOMATED: CPT

## 2017-12-15 PROCEDURE — 99285 EMERGENCY DEPT VISIT HI MDM: CPT | Mod: 25

## 2017-12-15 PROCEDURE — 74177 CT ABD & PELVIS W/CONTRAST: CPT

## 2017-12-15 PROCEDURE — G0378: CPT

## 2017-12-15 PROCEDURE — 80053 COMPREHEN METABOLIC PANEL: CPT

## 2017-12-15 PROCEDURE — 84443 ASSAY THYROID STIM HORMONE: CPT

## 2017-12-15 PROCEDURE — 82553 CREATINE MB FRACTION: CPT

## 2017-12-15 PROCEDURE — 84295 ASSAY OF SERUM SODIUM: CPT

## 2017-12-15 PROCEDURE — 83605 ASSAY OF LACTIC ACID: CPT

## 2017-12-15 PROCEDURE — 82550 ASSAY OF CK (CPK): CPT

## 2017-12-15 PROCEDURE — 82803 BLOOD GASES ANY COMBINATION: CPT

## 2017-12-15 PROCEDURE — 84484 ASSAY OF TROPONIN QUANT: CPT

## 2017-12-19 PROBLEM — Z82.61 FAMILY HISTORY OF ARTHRITIS: Status: ACTIVE | Noted: 2017-10-10

## 2017-12-19 PROBLEM — Z78.9 NON-SMOKER: Status: ACTIVE | Noted: 2017-10-10

## 2018-03-01 ENCOUNTER — OUTPATIENT (OUTPATIENT)
Dept: OUTPATIENT SERVICES | Facility: HOSPITAL | Age: 63
LOS: 1 days | End: 2018-03-01
Payer: MEDICAID

## 2018-03-01 PROCEDURE — G9001: CPT

## 2018-03-19 DIAGNOSIS — R69 ILLNESS, UNSPECIFIED: ICD-10-CM

## 2019-05-20 PROBLEM — G35 MULTIPLE SCLEROSIS: Chronic | Status: ACTIVE | Noted: 2017-02-27

## 2019-07-16 ENCOUNTER — APPOINTMENT (OUTPATIENT)
Dept: DERMATOLOGY | Facility: CLINIC | Age: 64
End: 2019-07-16

## 2019-07-18 ENCOUNTER — APPOINTMENT (OUTPATIENT)
Dept: DERMATOLOGY | Facility: CLINIC | Age: 64
End: 2019-07-18

## 2019-07-30 ENCOUNTER — TRANSCRIPTION ENCOUNTER (OUTPATIENT)
Age: 64
End: 2019-07-30

## 2020-03-12 ENCOUNTER — APPOINTMENT (OUTPATIENT)
Dept: OTOLARYNGOLOGY | Facility: CLINIC | Age: 65
End: 2020-03-12
Payer: COMMERCIAL

## 2020-03-12 VITALS
HEART RATE: 93 BPM | SYSTOLIC BLOOD PRESSURE: 127 MMHG | DIASTOLIC BLOOD PRESSURE: 82 MMHG | HEIGHT: 65 IN | WEIGHT: 126 LBS | BODY MASS INDEX: 20.99 KG/M2

## 2020-03-12 PROCEDURE — 99204 OFFICE O/P NEW MOD 45 MIN: CPT

## 2020-03-12 RX ORDER — CHOLECALCIFEROL (VITAMIN D3) 1250 MCG
1.25 MG CAPSULE ORAL
Qty: 4 | Refills: 0 | Status: COMPLETED | COMMUNITY
Start: 2017-10-17 | End: 2020-03-12

## 2020-03-12 RX ORDER — PREDNISONE 50 MG/1
TABLET ORAL
Refills: 0 | Status: COMPLETED | COMMUNITY
End: 2020-03-12

## 2020-03-12 RX ORDER — PREDNISONE 2.5 MG/1
2.5 TABLET ORAL
Qty: 90 | Refills: 1 | Status: COMPLETED | COMMUNITY
Start: 2017-10-10 | End: 2020-03-12

## 2020-03-12 NOTE — PHYSICAL EXAM
[Midline] : trachea located in midline position [Normal] : no rashes [de-identified] : 2 cm R parotid mass.

## 2020-03-12 NOTE — HISTORY OF PRESENT ILLNESS
[de-identified] : Patient hx of vocal cord polyp and thyroid nodules and referred by Dr. Greco for right paroitd mass. 10/2019 sono showed 2.8 cm hypoechoic lobular right parotid gland and 20/20 bx showed atypical cell , neg for P40 abd S100. First noticed 7/2019 incidental finding on mri for stroke.   \par Denies pain, dysphagia, odynophagia, dyspnea, dysphonia, nasal obstruction/bleeding, fever, weakness or weight loss.\par \par

## 2020-03-12 NOTE — CONSULT LETTER
[Dear  ___] : Dear  [unfilled], [Consult Letter:] : I had the pleasure of evaluating your patient, [unfilled]. [Please see my note below.] : Please see my note below. [Consult Closing:] : Thank you very much for allowing me to participate in the care of this patient.  If you have any questions, please do not hesitate to contact me. [Sincerely,] : Sincerely, [FreeTextEntry2] : Dr Renzo Larson [FreeTextEntry3] : \par Dieter Holman MD, FACS\par \par Otolaryngology-Head and Neck Surgery\par [employer] [facility]\par

## 2020-07-16 RX ORDER — INTERFERON BETA-1A 22 UG/.5ML
30 INJECTION, SOLUTION SUBCUTANEOUS
Qty: 0 | Refills: 0 | DISCHARGE

## 2020-07-17 ENCOUNTER — OUTPATIENT (OUTPATIENT)
Dept: OUTPATIENT SERVICES | Facility: HOSPITAL | Age: 65
LOS: 1 days | End: 2020-07-17
Payer: MEDICARE

## 2020-07-17 VITALS
OXYGEN SATURATION: 99 % | HEART RATE: 75 BPM | SYSTOLIC BLOOD PRESSURE: 140 MMHG | TEMPERATURE: 97 F | RESPIRATION RATE: 15 BRPM | HEIGHT: 66 IN | WEIGHT: 134.04 LBS | DIASTOLIC BLOOD PRESSURE: 70 MMHG

## 2020-07-17 DIAGNOSIS — Z01.818 ENCOUNTER FOR OTHER PREPROCEDURAL EXAMINATION: ICD-10-CM

## 2020-07-17 DIAGNOSIS — K11.8 OTHER DISEASES OF SALIVARY GLANDS: ICD-10-CM

## 2020-07-17 DIAGNOSIS — Z86.69 PERSONAL HISTORY OF OTHER DISEASES OF THE NERVOUS SYSTEM AND SENSE ORGANS: ICD-10-CM

## 2020-07-17 DIAGNOSIS — Z90.89 ACQUIRED ABSENCE OF OTHER ORGANS: Chronic | ICD-10-CM

## 2020-07-17 DIAGNOSIS — I10 ESSENTIAL (PRIMARY) HYPERTENSION: ICD-10-CM

## 2020-07-17 DIAGNOSIS — G35 MULTIPLE SCLEROSIS: ICD-10-CM

## 2020-07-17 LAB
ANION GAP SERPL CALC-SCNC: 10 MMO/L — SIGNIFICANT CHANGE UP (ref 7–14)
BLD GP AB SCN SERPL QL: NEGATIVE — SIGNIFICANT CHANGE UP
BUN SERPL-MCNC: 11 MG/DL — SIGNIFICANT CHANGE UP (ref 7–23)
CALCIUM SERPL-MCNC: 9.1 MG/DL — SIGNIFICANT CHANGE UP (ref 8.4–10.5)
CHLORIDE SERPL-SCNC: 101 MMOL/L — SIGNIFICANT CHANGE UP (ref 98–107)
CO2 SERPL-SCNC: 25 MMOL/L — SIGNIFICANT CHANGE UP (ref 22–31)
CREAT SERPL-MCNC: 0.48 MG/DL — LOW (ref 0.5–1.3)
GLUCOSE SERPL-MCNC: 116 MG/DL — HIGH (ref 70–99)
HCT VFR BLD CALC: 33.6 % — LOW (ref 34.5–45)
HGB BLD-MCNC: 10.4 G/DL — LOW (ref 11.5–15.5)
MCHC RBC-ENTMCNC: 25.4 PG — LOW (ref 27–34)
MCHC RBC-ENTMCNC: 31 % — LOW (ref 32–36)
MCV RBC AUTO: 82 FL — SIGNIFICANT CHANGE UP (ref 80–100)
NRBC # FLD: 0 K/UL — SIGNIFICANT CHANGE UP (ref 0–0)
PLATELET # BLD AUTO: 314 K/UL — SIGNIFICANT CHANGE UP (ref 150–400)
PMV BLD: 11 FL — SIGNIFICANT CHANGE UP (ref 7–13)
POTASSIUM SERPL-MCNC: 4.2 MMOL/L — SIGNIFICANT CHANGE UP (ref 3.5–5.3)
POTASSIUM SERPL-SCNC: 4.2 MMOL/L — SIGNIFICANT CHANGE UP (ref 3.5–5.3)
RBC # BLD: 4.1 M/UL — SIGNIFICANT CHANGE UP (ref 3.8–5.2)
RBC # FLD: 17.6 % — HIGH (ref 10.3–14.5)
RH IG SCN BLD-IMP: POSITIVE — SIGNIFICANT CHANGE UP
SODIUM SERPL-SCNC: 136 MMOL/L — SIGNIFICANT CHANGE UP (ref 135–145)
WBC # BLD: 5.35 K/UL — SIGNIFICANT CHANGE UP (ref 3.8–10.5)
WBC # FLD AUTO: 5.35 K/UL — SIGNIFICANT CHANGE UP (ref 3.8–10.5)

## 2020-07-17 PROCEDURE — 93010 ELECTROCARDIOGRAM REPORT: CPT

## 2020-07-17 NOTE — H&P PST ADULT - NSANTHOSAYNRD_GEN_A_CORE
No. YULIET screening performed.  STOP BANG Legend: 0-2 = LOW Risk; 3-4 = INTERMEDIATE Risk; 5-8 = HIGH Risk

## 2020-07-17 NOTE — H&P PST ADULT - RS GEN PE MLT RESP DETAILS PC
no rhonchi/no wheezes/no rales/clear to auscultation bilaterally/respirations non-labored/breath sounds equal

## 2020-07-17 NOTE — H&P PST ADULT - NSICDXPASTMEDICALHX_GEN_ALL_CORE_FT
PAST MEDICAL HISTORY:  H/O neuropathy left upper arm post CVA 2019    Hypertension     Multiple sclerosis

## 2020-07-17 NOTE — H&P PST ADULT - HISTORY OF PRESENT ILLNESS
This is a 65 y.o. female s/p CVA 7/19 - residual nerve pain to upper left arm . Pt hd MRI of brain with incidental finding of other disease of salivary gland . Pt referred to Dr Holman, now for surgery .

## 2020-07-17 NOTE — H&P PST ADULT - NSICDXPROBLEM_GEN_ALL_CORE_FT
PROBLEM DIAGNOSES  Problem: Other diseases of salivary glands  Assessment and Plan: Right parotidectomy sternocleidomastoid flap   Medical clearance requested by Dr Reid , management of asprin needed for OR .   Chlorhexidine wash given with verbal and written instructions, teach back appropriate .     Problem: Hypertension  Assessment and Plan: pt instructed to take amlodipine day of surgery . Monitor BP durign hospital stay .     Problem: MS (multiple sclerosis)  Assessment and Plan: last neurological note requested from Dr Palmer [phone].     Problem: History of neuropathy  Assessment and Plan: pt instructed to take gabapentin day ofsurgery , as prescribed. PROBLEM DIAGNOSES  Problem: Other diseases of salivary glands  Assessment and Plan: Right parotidectomy sternocleidomastoid flap   Medical clearance requested by Dr Reid , management of aspirin needed for OR .   Chlorhexidine wash given with verbal and written instructions, teach back appropriate .     Problem: Hypertension  Assessment and Plan: pt instructed to take amlodipine day of surgery . Monitor BP during hospital stay . EKG comparison with last echo from cardiology , Dr Payton discussed with Dr Davis.     Problem: MS (multiple sclerosis)  Assessment and Plan: last neurological note requested from Dr Palmer [phone].     Problem: History of neuropathy  Assessment and Plan: pt instructed to take gabapentin day ofsurgery , as prescribed.

## 2020-07-24 ENCOUNTER — TRANSCRIPTION ENCOUNTER (OUTPATIENT)
Age: 65
End: 2020-07-24

## 2020-07-24 ENCOUNTER — APPOINTMENT (OUTPATIENT)
Dept: DISASTER EMERGENCY | Facility: CLINIC | Age: 65
End: 2020-07-24

## 2020-07-24 PROBLEM — I10 ESSENTIAL (PRIMARY) HYPERTENSION: Chronic | Status: ACTIVE | Noted: 2020-07-17

## 2020-07-24 LAB — SARS-COV-2 N GENE NPH QL NAA+PROBE: NOT DETECTED

## 2020-07-24 NOTE — ASU PATIENT PROFILE, ADULT - BILL OF RIGHTS/ADMISSION INFORMATION PROVIDED TO:
Other (Free Text): Slightly better. Recommend to only use Vaseline and discontinue chapstick. Discontinue [employer] toothpaste. \\nEpicyn sample given to apply Qhs. Patient lives over one hour away so will call if not better in one week. Detail Level: Zone Note Text (......Xxx Chief Complaint.): **on terbinafine for three months. Recomend Stayoff three months, then retreat two months Patient

## 2020-07-25 ENCOUNTER — APPOINTMENT (OUTPATIENT)
Dept: OTOLARYNGOLOGY | Facility: HOSPITAL | Age: 65
End: 2020-07-25

## 2020-07-25 ENCOUNTER — RESULT REVIEW (OUTPATIENT)
Age: 65
End: 2020-07-25

## 2020-07-25 ENCOUNTER — OUTPATIENT (OUTPATIENT)
Dept: OUTPATIENT SERVICES | Facility: HOSPITAL | Age: 65
LOS: 1 days | Discharge: HOME | End: 2020-07-25
Payer: MEDICARE

## 2020-07-25 VITALS
HEIGHT: 66 IN | SYSTOLIC BLOOD PRESSURE: 135 MMHG | OXYGEN SATURATION: 100 % | RESPIRATION RATE: 16 BRPM | TEMPERATURE: 98 F | HEART RATE: 82 BPM | WEIGHT: 134.04 LBS | DIASTOLIC BLOOD PRESSURE: 58 MMHG

## 2020-07-25 VITALS
SYSTOLIC BLOOD PRESSURE: 107 MMHG | OXYGEN SATURATION: 97 % | HEART RATE: 70 BPM | RESPIRATION RATE: 12 BRPM | DIASTOLIC BLOOD PRESSURE: 77 MMHG

## 2020-07-25 DIAGNOSIS — K11.8 OTHER DISEASES OF SALIVARY GLANDS: ICD-10-CM

## 2020-07-25 DIAGNOSIS — Z90.89 ACQUIRED ABSENCE OF OTHER ORGANS: Chronic | ICD-10-CM

## 2020-07-25 LAB — RH IG SCN BLD-IMP: POSITIVE — SIGNIFICANT CHANGE UP

## 2020-07-25 PROCEDURE — 88360 TUMOR IMMUNOHISTOCHEM/MANUAL: CPT | Mod: 26

## 2020-07-25 PROCEDURE — 88311 DECALCIFY TISSUE: CPT | Mod: 26

## 2020-07-25 PROCEDURE — 15733 MUSC MYOQ/FSCQ FLP H&N PEDCL: CPT

## 2020-07-25 PROCEDURE — 42420 EXCISE PAROTID GLAND/LESION: CPT

## 2020-07-25 PROCEDURE — 88307 TISSUE EXAM BY PATHOLOGIST: CPT | Mod: 26

## 2020-07-25 PROCEDURE — 88342 IMHCHEM/IMCYTCHM 1ST ANTB: CPT | Mod: 26,59

## 2020-07-25 PROCEDURE — 88334 PATH CONSLTJ SURG CYTO XM EA: CPT | Mod: 26,59

## 2020-07-25 PROCEDURE — 88331 PATH CONSLTJ SURG 1 BLK 1SPC: CPT | Mod: 26

## 2020-07-25 PROCEDURE — 88341 IMHCHEM/IMCYTCHM EA ADD ANTB: CPT | Mod: 26,59

## 2020-07-25 RX ORDER — SODIUM CHLORIDE 9 MG/ML
1000 INJECTION, SOLUTION INTRAVENOUS
Refills: 0 | Status: DISCONTINUED | OUTPATIENT
Start: 2020-07-25 | End: 2020-08-09

## 2020-07-25 RX ORDER — SODIUM CHLORIDE 9 MG/ML
1000 INJECTION, SOLUTION INTRAVENOUS
Refills: 0 | Status: DISCONTINUED | OUTPATIENT
Start: 2020-07-25 | End: 2020-07-25

## 2020-07-25 RX ORDER — ACETAMINOPHEN 500 MG
650 TABLET ORAL EVERY 4 HOURS
Refills: 0 | Status: DISCONTINUED | OUTPATIENT
Start: 2020-07-25 | End: 2020-08-09

## 2020-07-25 RX ORDER — OXYCODONE AND ACETAMINOPHEN 5; 325 MG/1; MG/1
1 TABLET ORAL
Qty: 8 | Refills: 0
Start: 2020-07-25 | End: 2020-07-26

## 2020-07-25 RX ORDER — OXYCODONE AND ACETAMINOPHEN 5; 325 MG/1; MG/1
2 TABLET ORAL
Qty: 0 | Refills: 0 | DISCHARGE
Start: 2020-07-25

## 2020-07-25 RX ORDER — ACETAMINOPHEN 500 MG
2 TABLET ORAL
Qty: 0 | Refills: 0 | DISCHARGE
Start: 2020-07-25

## 2020-07-25 RX ORDER — OXYCODONE AND ACETAMINOPHEN 5; 325 MG/1; MG/1
1 TABLET ORAL EVERY 4 HOURS
Refills: 0 | Status: DISCONTINUED | OUTPATIENT
Start: 2020-07-25 | End: 2020-07-25

## 2020-07-25 RX ORDER — OXYCODONE AND ACETAMINOPHEN 5; 325 MG/1; MG/1
2 TABLET ORAL EVERY 6 HOURS
Refills: 0 | Status: DISCONTINUED | OUTPATIENT
Start: 2020-07-25 | End: 2020-07-25

## 2020-07-25 RX ADMIN — SODIUM CHLORIDE 30 MILLILITER(S): 9 INJECTION, SOLUTION INTRAVENOUS at 15:08

## 2020-07-25 RX ADMIN — OXYCODONE AND ACETAMINOPHEN 1 TABLET(S): 5; 325 TABLET ORAL at 16:45

## 2020-07-25 NOTE — ASU DISCHARGE PLAN (ADULT/PEDIATRIC) - CALL YOUR DOCTOR IF YOU HAVE ANY OF THE FOLLOWING:
Bleeding that does not stop/Inability to tolerate liquids or foods/Fever greater than (need to indicate Fahrenheit or Celsius)/Pain not relieved by Medications/Nausea and vomiting that does not stop

## 2020-07-25 NOTE — ASU PREOP CHECKLIST - SELECT TESTS ORDERED
CMP/CBC/PT/PTT/INR/EKG/Type and Cross/Type and Screen/POCT Blood Glucose Type and Screen/CBC/INR/CMP/EKG/PT/PTT/Type and Cross

## 2020-07-25 NOTE — BRIEF OPERATIVE NOTE - OPERATION/FINDINGS
R sided parotid mass - well circumscribed with no invasion into the facial nerve. Mass pushed the facial nerve main trunk inferiorly and superficially. Facial nerve was dissected and preserved. Strong stimulation of facial nerve at end of the case with visible muscle twitches of the face.

## 2020-07-25 NOTE — ASU DISCHARGE PLAN (ADULT/PEDIATRIC) - CARE PROVIDER_API CALL
Dieter Holman  OTOLARYNGOLOGY  [address] [place] [address]  Phone: [phone]  Fax: [phone]  Follow Up Time:

## 2020-07-25 NOTE — ASU DISCHARGE PLAN (ADULT/PEDIATRIC) - ASU DC SPECIAL INSTRUCTIONSFT
General Discharge Instructions:  Please resume all regular home medications unless specifically advised not to take a particular medication. Also, please take any new medications as prescribed.  Please get plenty of rest, continue to ambulate several times per day, and drink adequate amounts of fluids. Avoid lifting weights greater than 5-10 lbs until you follow-up with your surgeon, who will instruct you further regarding activity restrictions.  Avoid driving or operating heavy machinery while taking pain medications.  Please follow-up with your surgeon and Primary Care Provider (PCP) as advised.  Incision Care:  *Please call your doctor or nurse practitioner if you have increased pain, swelling, redness, or drainage from the incision site.  *Avoid swimming and baths until your follow-up appointment.  *You may shower, and wash surgical incisions with a mild soap and warm water. Gently pat the area dry.  *If you have steri-strips, they will fall off on their own. Please remove any remaining strips 7-10 days after surgery.    Warning Signs:  Please call your doctor or nurse practitioner if you experience the following:  *You experience new chest pain, pressure, squeezing or tightness.  *New or worsening cough, shortness of breath, or wheeze.  *If you are vomiting and cannot keep down fluids or your medications.  *You are getting dehydrated due to continued vomiting, diarrhea, or other reasons. Signs of dehydration include dry mouth, rapid heartbeat, or feeling dizzy or faint when standing.  *Your pain is not improving within 8-12 hours or is not gone within 24 hours.  *You have shaking chills, or fever greater than 101.5 degrees Fahrenheit or 38 degrees Celsius.  *Any change in your symptoms, or any new symptoms that concern you.

## 2020-07-25 NOTE — BRIEF OPERATIVE NOTE - NSICDXBRIEFPROCEDURE_GEN_ALL_CORE_FT
PROCEDURES:  Creation of sternocleidomastoid flap 25-Jul-2020 15:02:30  Marcelo King  Parotidectomy, total, with facial nerve dissection 25-Jul-2020 15:01:43  Marcelo King

## 2020-07-29 ENCOUNTER — OUTPATIENT (OUTPATIENT)
Dept: OUTPATIENT SERVICES | Facility: HOSPITAL | Age: 65
LOS: 1 days | Discharge: HOME | End: 2020-07-29

## 2020-07-29 ENCOUNTER — APPOINTMENT (OUTPATIENT)
Dept: OTOLARYNGOLOGY | Facility: CLINIC | Age: 65
End: 2020-07-29
Payer: COMMERCIAL

## 2020-07-29 DIAGNOSIS — Z90.89 ACQUIRED ABSENCE OF OTHER ORGANS: Chronic | ICD-10-CM

## 2020-07-29 PROCEDURE — 99024 POSTOP FOLLOW-UP VISIT: CPT

## 2020-07-29 NOTE — HISTORY OF PRESENT ILLNESS
[de-identified] : pt is here for post right parotidectomy on 7/25/2020 for parotid mass and here for drain removal. pt is doing well, with mild ear pain and neck discomfort. no dysphagia or dyspnea or swelling.

## 2020-08-13 ENCOUNTER — APPOINTMENT (OUTPATIENT)
Dept: OTOLARYNGOLOGY | Facility: CLINIC | Age: 65
End: 2020-08-13
Payer: MEDICARE

## 2020-08-13 DIAGNOSIS — K11.8 OTHER DISEASES OF SALIVARY GLANDS: ICD-10-CM

## 2020-08-13 PROCEDURE — 99024 POSTOP FOLLOW-UP VISIT: CPT

## 2020-08-13 NOTE — PHYSICAL EXAM
[de-identified] : Incision C/D/I. [Midline] : trachea located in midline position [Normal] : no rashes

## 2020-08-13 NOTE — HISTORY OF PRESENT ILLNESS
[de-identified] : 65 year old female follow up s/p Right total parotidectomy.  Right sternocleidomastoid flap reconstruction, 07/25/2020\par Denies pain, dysphagia, odynophagia, dyspnea, fevers, swelling.  Reports occasional right ear pain and itching.

## 2020-08-13 NOTE — REASON FOR VISIT
[Subsequent Evaluation] : a subsequent evaluation for [Other: _____] : [unfilled] [FreeTextEntry2] : s/p Right total parotidectomy.  Right sternocleidomastoid flap reconstruction, 07/25/2020

## 2020-08-13 NOTE — CONSULT LETTER
[Dear  ___] : Dear  [unfilled], [Courtesy Letter:] : I had the pleasure of seeing your patient, [unfilled], in my office today. [Please see my note below.] : Please see my note below. [Consult Closing:] : Thank you very much for allowing me to participate in the care of this patient.  If you have any questions, please do not hesitate to contact me. [FreeTextEntry2] : Dr Renzo Larson  [Sincerely,] : Sincerely, [FreeTextEntry3] : \par Dieter Holman MD, FACS\par \par Otolaryngology-Head and Neck Surgery\par [employer] [facility]\par

## 2020-08-24 ENCOUNTER — RESULT REVIEW (OUTPATIENT)
Age: 65
End: 2020-08-24

## 2020-08-24 ENCOUNTER — APPOINTMENT (OUTPATIENT)
Dept: NUCLEAR MEDICINE | Facility: CLINIC | Age: 65
End: 2020-08-24
Payer: MEDICARE

## 2020-08-24 ENCOUNTER — OUTPATIENT (OUTPATIENT)
Dept: OUTPATIENT SERVICES | Facility: HOSPITAL | Age: 65
LOS: 1 days | End: 2020-08-24

## 2020-08-24 DIAGNOSIS — Z90.89 ACQUIRED ABSENCE OF OTHER ORGANS: Chronic | ICD-10-CM

## 2020-08-24 DIAGNOSIS — K11.8 OTHER DISEASES OF SALIVARY GLANDS: ICD-10-CM

## 2020-08-24 PROCEDURE — 78815 PET IMAGE W/CT SKULL-THIGH: CPT | Mod: 26,PS

## 2020-08-25 ENCOUNTER — APPOINTMENT (OUTPATIENT)
Dept: RADIATION ONCOLOGY | Facility: CLINIC | Age: 65
End: 2020-08-25
Payer: MEDICARE

## 2020-08-25 VITALS
HEART RATE: 90 BPM | BODY MASS INDEX: 21.7 KG/M2 | WEIGHT: 130.4 LBS | SYSTOLIC BLOOD PRESSURE: 143 MMHG | OXYGEN SATURATION: 98 % | RESPIRATION RATE: 16 BRPM | DIASTOLIC BLOOD PRESSURE: 91 MMHG

## 2020-08-25 DIAGNOSIS — Z80.3 FAMILY HISTORY OF MALIGNANT NEOPLASM OF BREAST: ICD-10-CM

## 2020-08-25 DIAGNOSIS — Z90.49 ACQUIRED ABSENCE OF OTHER SPECIFIED PARTS OF DIGESTIVE TRACT: ICD-10-CM

## 2020-08-25 PROCEDURE — 99205 OFFICE O/P NEW HI 60 MIN: CPT | Mod: 25,GC

## 2020-08-25 NOTE — LETTER CLOSING
[FreeTextEntry3] : Nish Tomas MD\par Physician in Chief\par Department of Radiation Medicine\par [facility]\par [facility]\par \par  of Radiation Medicine\par [employer] [facility]\par at  [facility]\par \par Radiation \par [facility]/\par [facility] [place]\par [address]\par [place], [place] [address]\par \par Tel: [phone]\par Fax: [phone]\par  [Consult Closing:] : Thank you for allowing me to participate in the care of this patient.  If you have any questions, please do not hesitate to contact me.

## 2020-08-25 NOTE — DISEASE MANAGEMENT
[Pathological] : TNM Stage: p [II] : II [FreeTextEntry4] : carcinoma, pleomorphic adenoma [TTNM] : 2 [NTNM] : x [MTNM] : x

## 2020-08-25 NOTE — PHYSICAL EXAM
[Normal] : no joint swelling, no clubbing or cyanosis of the fingernails and muscle strength and tone were normal [de-identified] : well healed right partid surgical site. Face symmetrical [de-identified] : Well-healed scar

## 2020-08-25 NOTE — REASON FOR VISIT
[Consideration of Curative Therapy] : consideration of curative therapy for [Head and Neck Cancer] : head and neck cancer [Other: _____] : [unfilled]

## 2020-08-25 NOTE — HISTORY OF PRESENT ILLNESS
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 Incidental findings on MRI on 7/8/2019 showed an abnormal heterogeneous enhancing mass along the dorsal aspect of the right parotid gland, measuring approximately 2.4 x 1.5 x 3.0 cm. She reports FNA was done of right parotid which showed atypical cells (although we do not have this path).\par \par She was eventually evaluated by Dr. Vladez. He performed right total parotidectomy with right sternocleidomastoid flap reconstruction.  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 Negative margins, intraparotid lymph nodes negative for metastasis.  No neck dissection was performed.  \par \par PET scan 8/24/2020 - No residual \par 1. Postsurgical changes of right parotidectomy. Linear avidity in surgical bed probably represents postsurgical changes. \par 2. FDG avid difficult to delinate focus right level 2b region probably a small lymph node, indeterminate, possibly metastatic or reactive. Additional subcentimeter right level IIa lymph nodes are also indeterminate.\par 3. FDG avid irregular nodules or nodular thickening right lung apex, indeterminate. Suggest evaluation with CT chest. \par 4. Left adnexal cystic mass is unchanged since 2017.\par \par She presents for consultation. She has some mild residual tenderness from the surgery, but is otherwise without complaint. She denies change in taste, salivary function, swallowing, breathing. \par \par PMHx: Multiple sclerosis\par FamHx: Multiple family members with breast cancer

## 2020-08-25 NOTE — LETTER GREETING
[Dear Doctor] : Dear Doctor, [Consult Letter:] : Your patient, [unfilled] was seen in my office today for consultation. [Please see my note below.] : Please see my note below. [FreeTextEntry2] : Dieter Holman MD

## 2020-08-25 NOTE — VITALS
[Least Pain Intensity: 0/10] : 0/10 [NoTreatment Scheduled] : no treatment scheduled [90: Able to carry normal activity; minor signs or symptoms of disease.] : 90: Able to carry normal activity; minor signs or symptoms of disease.  [Maximal Pain Intensity: 2/10] : 2/10 [ECOG Performance Status: 1 - Restricted in physically strenuous activity but ambulatory and able to carry out work of a light or sedentary nature] : Performance Status: 1 - Restricted in physically strenuous activity but ambulatory and able to carry out work of a light or sedentary nature, e.g., light house work, office work [Pain Location: ___] : Pain Location: [unfilled]

## 2020-08-25 NOTE — REVIEW OF SYSTEMS
[Dysphagia] : no dysphagia [Loss of Hearing] : no loss of hearing [Hearing Disturbances] : no hearing disturbances [Nosebleeds] : no nosebleeds [Palpitations] : no palpitations [Leg Claudication] : no intermittent leg claudication [Lower Ext Edema] : no lower extremity edema [Chest Pain] : no chest pain [Gait Disturbance] : no gait disturbance [FreeTextEntry4] : mild tenderness over right parotid surgical site. [Negative] : Heme/Lymph [FreeTextEntry9] : MS [FreeTextEntry5] : Hx CVA (no residual) , hypertention

## 2020-09-25 ENCOUNTER — APPOINTMENT (OUTPATIENT)
Dept: OTOLARYNGOLOGY | Facility: CLINIC | Age: 65
End: 2020-09-25
Payer: MEDICARE

## 2020-09-25 VITALS
HEART RATE: 80 BPM | WEIGHT: 131.6 LBS | BODY MASS INDEX: 21.9 KG/M2 | SYSTOLIC BLOOD PRESSURE: 133 MMHG | DIASTOLIC BLOOD PRESSURE: 70 MMHG

## 2020-09-25 PROCEDURE — 99024 POSTOP FOLLOW-UP VISIT: CPT

## 2020-09-25 NOTE — PHYSICAL EXAM
[Midline] : trachea located in midline position [Normal] : no rashes [de-identified] : Incision C/D/I. YASH

## 2020-09-25 NOTE — HISTORY OF PRESENT ILLNESS
[de-identified] : 65 year old female follow up s/p Right total parotidectomy.  Right sternocleidomastoid flap reconstruction, 07/25/2020\par Denies pain, dysphagia, odynophagia, dyspnea, fevers, swelling.  Reports occasional right ear pain and itching.  Pt did not have adjuvant treatment.\par Complete review of systems which was performed during a previous encounter was reviewed with the patient and there are no changes except as stated in the HPI section.\par

## 2020-09-25 NOTE — CONSULT LETTER
[Dear  ___] : Dear  [unfilled], [Courtesy Letter:] : I had the pleasure of seeing your patient, [unfilled], in my office today. [Please see my note below.] : Please see my note below. [Consult Closing:] : Thank you very much for allowing me to participate in the care of this patient.  If you have any questions, please do not hesitate to contact me. [Sincerely,] : Sincerely, [FreeTextEntry2] : Dr Renzo Larson  [FreeTextEntry3] : \par Dieter Holman MD, FACS\par \par Otolaryngology-Head and Neck Surgery\par [employer] at [facility]\par

## 2020-10-03 NOTE — CONSULT NOTE ADULT - CONSULT REASON
Physical Therapy  Visit Type: initial evaluation  Precautions:  Medical precautions:  fall risk;.   Lines:     Basic: IV, telemetry, G-tube and O2      Lines in chart and on patient reviewed, cautions maintained throughout session.  Hearing: no hearing deficits  Safety Measures: bed alarm and bed rails      SUBJECTIVE                                                                                                            Patient agreed to participate in therapy this date.  Pt adm from NH with tachycardia, tachpnea.  Patient / Family Goal: unable to state      OBJECTIVE                                                                                                              Level of consciousness: lethargic    Oriented to person     Disoriented to place, time and situation    Arousal alertness: appropriate responses to stimuli    Affect/Behavior: sleepy and confused  Functional Communication/Cognition    Overall status:  Impaired    Form of communication:  Verbal     Attention span:  Attends with cues to redirect    Attention Span Impairment: fatigue    Commands: follows one step commands with increased time.    Transition between tasks: transition with cues.    Safety judgement: unable to assess.  Range of Motion (measured in degrees unless otherwise noted, active unless indicated)  WFL: RLE, LLE  Strength (out of 5 unless otherwise indicated)   WFL: LLE, RLE  Balance    Sitting: Static: minimal assist, Dynamic: moderate assist  Bed Mobility:      Supine to sit: maximal assist    Sit to supine: moderate assist  Training completed:    Tasks: sit to supine and supine to sit    Education details: patient safety  Transfers:      Sit to stand: not attempted due to safety concerns    Stand to sit: not attempted due to safety concerns        Interventions                                                                                                       Training provided: bed mobility training, safety training and 
activity tolerance  Pt lethargic. Reports feeling dizzy while seated at EOB. Returned to supine.  Skilled input: Verbal instruction/cues and tactile instruction/cues  Verbal Consent: Writer verbally educated and received verbal consent for hand placement, positioning of patient, and techniques to be performed today from patient for therapist position for techniques and clothing adjustments for techniques as described above and how they are pertinent to the patient's plan of care.        ASSESSMENT                                                                                                                Impairments: safety awareness, balance deficits and activity tolerance  Functional Limitations: all functional mobility      Recommended Consults: PT: OT  Discharge Recommendations   Recommendation for Discharge: PT IL: Patient needs daily, skilled therapy for 1-3 hours a day by at least two disciplines, Patient requires 24 hour nonskilled assistance to perform mobility and/or ADLs safely                   Therapy Diagnosis:  Other Abnormalities of Gait and Mobility    Skilled therapy is required to address these limitations in attempt to maximize the patient's independence.  Progress: progressing toward goals  Predicted patient presentation: Moderate (evolving) - Patient comorbidities and complexities, as defined above, may have varying impact on steady progress for prescribed plan of care.    End of Session:   Location: in bed  Safety measures: alarm system in place/re-engaged, call light within reach, bed rails x4 and lines intact  Handoff to: nurse            PLAN                                                                                                                            Suggestions for next session as indicated: Progress OOB transfers as tolerated    PT Frequency: 3 days/week  Frequency Comments: 1/3 ECF  10.3    A minimum of 8 minutes per session x 1 week.  Interventions: bed mobility, 
functional transfer training, safety education, gait training and prosthetic/orthotic training  Agreement to plan and goals: patient agrees with goals and treatment plan        Goals Reviewed: 10/3/2020  GOALS:  Long Term Goals: (to be met by time of discharge from hospital)  Sit to supine: Patient will complete sit to supine minimal assist.  Supine to sit: Patient will complete supine to sit minimal assist.  Sit to stand: Patient will complete sit to stand transfer with moderate assist.   Stand to sit: Patient will complete stand to sit transfer with moderate assist.   Ambulation (even): Patient will ambulate on even surface for 5 feet with gait belt and hand hold, moderate assist and moderate cues.       Documented in the chart in the following areas: Prior Level of Function. Pain. Assessment.          
perihepatic fluid demonstrated on ct
Pericarditis
fever subhepatic lesion pericarditis

## 2020-11-13 ENCOUNTER — APPOINTMENT (OUTPATIENT)
Dept: OTOLARYNGOLOGY | Facility: CLINIC | Age: 65
End: 2020-11-13
Payer: MEDICARE

## 2020-11-13 VITALS — DIASTOLIC BLOOD PRESSURE: 69 MMHG | SYSTOLIC BLOOD PRESSURE: 144 MMHG | HEART RATE: 96 BPM

## 2020-11-13 PROCEDURE — 99072 ADDL SUPL MATRL&STAF TM PHE: CPT

## 2020-11-13 PROCEDURE — 99214 OFFICE O/P EST MOD 30 MIN: CPT

## 2020-11-13 NOTE — HISTORY OF PRESENT ILLNESS
[de-identified] : 65 year old female follow up s/p Right total parotidectomy.  Right sternocleidomastoid flap reconstruction, 07/25/2020\par Denies pain, dysphagia, odynophagia, dyspnea, fevers, swelling.  Pt did not have adjuvant treatment.\par Complete review of systems which was performed during a previous encounter was reviewed with the patient and there are no changes except as stated in the HPI section.\par

## 2020-11-13 NOTE — CONSULT LETTER
[Dear  ___] : Dear  [unfilled], [Courtesy Letter:] : I had the pleasure of seeing your patient, [unfilled], in my office today. [Please see my note below.] : Please see my note below. [Consult Closing:] : Thank you very much for allowing me to participate in the care of this patient.  If you have any questions, please do not hesitate to contact me. [Sincerely,] : Sincerely, [FreeTextEntry2] : Dr Renzo Larson  [FreeTextEntry3] : \par Dieter Holman MD, FACS\par \par Otolaryngology-Head and Neck Surgery\par [employer] at [facility]\par

## 2021-01-05 ENCOUNTER — APPOINTMENT (OUTPATIENT)
Dept: MRI IMAGING | Facility: CLINIC | Age: 66
End: 2021-01-05
Payer: MEDICARE

## 2021-01-05 ENCOUNTER — RESULT REVIEW (OUTPATIENT)
Age: 66
End: 2021-01-05

## 2021-01-05 ENCOUNTER — OUTPATIENT (OUTPATIENT)
Dept: OUTPATIENT SERVICES | Facility: HOSPITAL | Age: 66
LOS: 1 days | End: 2021-01-05

## 2021-01-05 DIAGNOSIS — Z90.89 ACQUIRED ABSENCE OF OTHER ORGANS: Chronic | ICD-10-CM

## 2021-01-05 DIAGNOSIS — C07 MALIGNANT NEOPLASM OF PAROTID GLAND: ICD-10-CM

## 2021-01-05 PROCEDURE — 70543 MRI ORBT/FAC/NCK W/O &W/DYE: CPT | Mod: 26

## 2021-01-06 ENCOUNTER — TRANSCRIPTION ENCOUNTER (OUTPATIENT)
Age: 66
End: 2021-01-06

## 2021-01-08 ENCOUNTER — APPOINTMENT (OUTPATIENT)
Dept: OTOLARYNGOLOGY | Facility: CLINIC | Age: 66
End: 2021-01-08

## 2021-01-22 ENCOUNTER — APPOINTMENT (OUTPATIENT)
Dept: OTOLARYNGOLOGY | Facility: CLINIC | Age: 66
End: 2021-01-22
Payer: MEDICARE

## 2021-01-22 VITALS — HEART RATE: 96 BPM | DIASTOLIC BLOOD PRESSURE: 88 MMHG | SYSTOLIC BLOOD PRESSURE: 164 MMHG

## 2021-01-22 PROCEDURE — 99072 ADDL SUPL MATRL&STAF TM PHE: CPT

## 2021-01-22 PROCEDURE — 99214 OFFICE O/P EST MOD 30 MIN: CPT

## 2021-01-22 NOTE — CONSULT LETTER
[Dear  ___] : Dear  [unfilled], [Courtesy Letter:] : I had the pleasure of seeing your patient, [unfilled], in my office today. [Please see my note below.] : Please see my note below. [Consult Closing:] : Thank you very much for allowing me to participate in the care of this patient.  If you have any questions, please do not hesitate to contact me. [Sincerely,] : Sincerely, [FreeTextEntry2] : Dr Renzo Larson  [FreeTextEntry3] : \par Dieter Holman MD, FACS\par \par Otolaryngology-Head and Neck Surgery\par [employer] at [facility]\par

## 2021-01-22 NOTE — HISTORY OF PRESENT ILLNESS
[de-identified] : 65 year old female follow up s/p Right total parotidectomy.  Right sternocleidomastoid flap reconstruction, 07/25/2020\par Last MRI 1/5/21. Denies pain, dysphagia, odynophagia, dyspnea, fevers, swelling.  Pt did not have adjuvant treatment. Patient had COVID in the beginning of January. Symptoms only lasted for 5 days.\par Complete review of systems which was performed during a previous encounter was reviewed with the patient and there are no changes except as stated in the HPI section.\par

## 2021-02-09 ENCOUNTER — OUTPATIENT (OUTPATIENT)
Dept: OUTPATIENT SERVICES | Facility: HOSPITAL | Age: 66
LOS: 1 days | End: 2021-02-09
Payer: COMMERCIAL

## 2021-02-09 ENCOUNTER — APPOINTMENT (OUTPATIENT)
Dept: ULTRASOUND IMAGING | Facility: CLINIC | Age: 66
End: 2021-02-09
Payer: MEDICARE

## 2021-02-09 ENCOUNTER — RESULT REVIEW (OUTPATIENT)
Age: 66
End: 2021-02-09

## 2021-02-09 DIAGNOSIS — Z00.8 ENCOUNTER FOR OTHER GENERAL EXAMINATION: ICD-10-CM

## 2021-02-09 DIAGNOSIS — Z90.89 ACQUIRED ABSENCE OF OTHER ORGANS: Chronic | ICD-10-CM

## 2021-02-09 DIAGNOSIS — C07 MALIGNANT NEOPLASM OF PAROTID GLAND: ICD-10-CM

## 2021-02-09 PROCEDURE — 10005 FNA BX W/US GDN 1ST LES: CPT

## 2021-02-09 PROCEDURE — 88173 CYTOPATH EVAL FNA REPORT: CPT

## 2021-02-09 PROCEDURE — 88173 CYTOPATH EVAL FNA REPORT: CPT | Mod: 26

## 2021-02-09 PROCEDURE — 88305 TISSUE EXAM BY PATHOLOGIST: CPT | Mod: 26

## 2021-02-09 PROCEDURE — 88305 TISSUE EXAM BY PATHOLOGIST: CPT

## 2021-03-31 ENCOUNTER — TRANSCRIPTION ENCOUNTER (OUTPATIENT)
Age: 66
End: 2021-03-31

## 2021-05-14 ENCOUNTER — APPOINTMENT (OUTPATIENT)
Dept: OTOLARYNGOLOGY | Facility: CLINIC | Age: 66
End: 2021-05-14
Payer: MEDICARE

## 2021-05-14 VITALS
BODY MASS INDEX: 21.83 KG/M2 | DIASTOLIC BLOOD PRESSURE: 75 MMHG | HEART RATE: 86 BPM | HEIGHT: 65 IN | WEIGHT: 131 LBS | SYSTOLIC BLOOD PRESSURE: 138 MMHG

## 2021-05-14 PROCEDURE — 99214 OFFICE O/P EST MOD 30 MIN: CPT

## 2021-05-14 PROCEDURE — 99072 ADDL SUPL MATRL&STAF TM PHE: CPT

## 2021-05-14 NOTE — HISTORY OF PRESENT ILLNESS
[de-identified] : 65 year old female follow up s/p Right total parotidectomy.  Right sternocleidomastoid flap reconstruction, 07/25/2020\par Last MRI 1/5/21. Denies pain, dysphagia, odynophagia, dyspnea, fevers, swelling.  Pt did not have adjuvant treatment. Patient had COVID in the beginning of January. Symptoms only lasted for 5 days.\par FNA of neck LN was benign.\par Complete review of systems which was performed during a previous encounter was reviewed with the patient and there are no changes except as stated in the HPI section.\par

## 2021-05-14 NOTE — CONSULT LETTER
[Dear  ___] : Dear  [unfilled], [Courtesy Letter:] : I had the pleasure of seeing your patient, [unfilled], in my office today. [Please see my note below.] : Please see my note below. [Consult Closing:] : Thank you very much for allowing me to participate in the care of this patient.  If you have any questions, please do not hesitate to contact me. [Sincerely,] : Sincerely, [FreeTextEntry2] : Dr Renzo Larson  [FreeTextEntry3] : \par Dieter Holman MD, FACS\par \par Otolaryngology-Head and Neck Surgery\par [employer] at [facility]\par

## 2021-05-28 ENCOUNTER — APPOINTMENT (OUTPATIENT)
Dept: OTOLARYNGOLOGY | Facility: CLINIC | Age: 66
End: 2021-05-28

## 2021-06-12 NOTE — ASU DISCHARGE PLAN (ADULT/PEDIATRIC) - DO NOT DRIVE IF TAKING PAIN MEDICATION
Patient is doing well. We did not make any medication changes today. Patient will return to this clinic in 3 to 4 months for a medication check. The facility is asking us for a visit summary form to be faxed to them at [phone].  
NULL

## 2021-07-09 ENCOUNTER — APPOINTMENT (OUTPATIENT)
Dept: OTOLARYNGOLOGY | Facility: CLINIC | Age: 66
End: 2021-07-09
Payer: MEDICARE

## 2021-07-09 VITALS
WEIGHT: 131 LBS | HEART RATE: 78 BPM | BODY MASS INDEX: 21.83 KG/M2 | HEIGHT: 65 IN | DIASTOLIC BLOOD PRESSURE: 76 MMHG | SYSTOLIC BLOOD PRESSURE: 133 MMHG

## 2021-07-09 DIAGNOSIS — Z78.9 OTHER SPECIFIED HEALTH STATUS: ICD-10-CM

## 2021-07-09 DIAGNOSIS — Z92.29 PERSONAL HISTORY OF OTHER DRUG THERAPY: ICD-10-CM

## 2021-07-09 PROCEDURE — 99214 OFFICE O/P EST MOD 30 MIN: CPT

## 2021-07-09 NOTE — HISTORY OF PRESENT ILLNESS
[de-identified] : 65 year old female here for 2 month follow up s/p Right total parotidectomy, Right sternocleidomastoid flap reconstruction, 07/25/2020. Last MRI 1/5/21. \par Today pt feeling well. Denies pain, dysphagia, dysphonia, dyspnea, fevers, swelling.  Pt did not have adjuvant treatment.\par FNA 2/12/2021 of neck LN was benign.\par Complete review of systems which was performed during a previous encounter was reviewed with the patient and there are no changes except as stated in the HPI section.\par

## 2021-07-09 NOTE — REASON FOR VISIT
[Subsequent Evaluation] : a subsequent evaluation for [Other: _____] : [unfilled] [FreeTextEntry2] : parotid mass

## 2021-07-09 NOTE — CONSULT LETTER
[Dear  ___] : Dear  [unfilled], [Courtesy Letter:] : I had the pleasure of seeing your patient, [unfilled], in my office today. [Please see my note below.] : Please see my note below. [Consult Closing:] : Thank you very much for allowing me to participate in the care of this patient.  If you have any questions, please do not hesitate to contact me. [Sincerely,] : Sincerely, [FreeTextEntry2] : Dr Renzo Larson  [FreeTextEntry3] : \par Dieter Holman MD, FACS\par \par Otolaryngology-Head and Neck Surgery\par [employer] at [facility]\par

## 2021-07-28 ENCOUNTER — APPOINTMENT (OUTPATIENT)
Dept: MRI IMAGING | Facility: CLINIC | Age: 66
End: 2021-07-28

## 2021-07-28 ENCOUNTER — OUTPATIENT (OUTPATIENT)
Dept: OUTPATIENT SERVICES | Facility: HOSPITAL | Age: 66
LOS: 1 days | End: 2021-07-28

## 2021-07-28 DIAGNOSIS — C07 MALIGNANT NEOPLASM OF PAROTID GLAND: ICD-10-CM

## 2021-07-28 DIAGNOSIS — Z90.89 ACQUIRED ABSENCE OF OTHER ORGANS: Chronic | ICD-10-CM

## 2021-08-16 ENCOUNTER — OUTPATIENT (OUTPATIENT)
Dept: OUTPATIENT SERVICES | Facility: HOSPITAL | Age: 66
LOS: 1 days | End: 2021-08-16

## 2021-08-16 ENCOUNTER — APPOINTMENT (OUTPATIENT)
Dept: MRI IMAGING | Facility: CLINIC | Age: 66
End: 2021-08-16
Payer: MEDICARE

## 2021-08-16 DIAGNOSIS — C07 MALIGNANT NEOPLASM OF PAROTID GLAND: ICD-10-CM

## 2021-08-16 DIAGNOSIS — Z90.89 ACQUIRED ABSENCE OF OTHER ORGANS: Chronic | ICD-10-CM

## 2021-08-16 PROCEDURE — 70543 MRI ORBT/FAC/NCK W/O &W/DYE: CPT | Mod: 26

## 2021-08-20 ENCOUNTER — NON-APPOINTMENT (OUTPATIENT)
Age: 66
End: 2021-08-20

## 2021-09-13 NOTE — DISCHARGE NOTE ADULT - CARE PROVIDER_API CALL
Sekou Mejia), Cardiology; Internal Medicine  [address] [address]  Phone: [phone]  Fax: [phone]
KAROLINA Villa

## 2021-10-08 ENCOUNTER — APPOINTMENT (OUTPATIENT)
Dept: OTOLARYNGOLOGY | Facility: CLINIC | Age: 66
End: 2021-10-08
Payer: MEDICARE

## 2021-10-08 VITALS
WEIGHT: 131 LBS | HEIGHT: 65 IN | BODY MASS INDEX: 21.83 KG/M2 | HEART RATE: 84 BPM | SYSTOLIC BLOOD PRESSURE: 140 MMHG | DIASTOLIC BLOOD PRESSURE: 77 MMHG

## 2021-10-08 PROCEDURE — 99214 OFFICE O/P EST MOD 30 MIN: CPT | Mod: 25

## 2021-10-08 PROCEDURE — 31575 DIAGNOSTIC LARYNGOSCOPY: CPT

## 2021-10-08 NOTE — CONSULT LETTER
[Dear  ___] : Dear  [unfilled], [Courtesy Letter:] : I had the pleasure of seeing your patient, [unfilled], in my office today. [Please see my note below.] : Please see my note below. [Consult Closing:] : Thank you very much for allowing me to participate in the care of this patient.  If you have any questions, please do not hesitate to contact me. [Sincerely,] : Sincerely, [FreeTextEntry2] : Dr Renzo Larson  [FreeTextEntry3] : \par Dieter Holman MD, FACS\par \par Otolaryngology-Head and Neck Surgery\par [employer] at [facility]\par

## 2021-10-08 NOTE — HISTORY OF PRESENT ILLNESS
[de-identified] : 66 year old female here for  follow up s/p Right total parotidectomy, Right sternocleidomastoid flap reconstruction, 07/25/2020. FNA 2/12/2021 of neck LN was benign. Last MRI  8/16/21.  No adjuvant treatment. \par \par Complete review of systems which was performed during a previous encounter was reviewed with the patient and there are no changes except as stated in the HPI section.\par

## 2021-10-27 ENCOUNTER — OUTPATIENT (OUTPATIENT)
Dept: OUTPATIENT SERVICES | Facility: HOSPITAL | Age: 66
LOS: 1 days | End: 2021-10-27

## 2021-10-27 VITALS
OXYGEN SATURATION: 98 % | WEIGHT: 132.94 LBS | TEMPERATURE: 98 F | HEART RATE: 65 BPM | HEIGHT: 65 IN | DIASTOLIC BLOOD PRESSURE: 80 MMHG | SYSTOLIC BLOOD PRESSURE: 142 MMHG | RESPIRATION RATE: 17 BRPM

## 2021-10-27 DIAGNOSIS — J38.3 OTHER DISEASES OF VOCAL CORDS: ICD-10-CM

## 2021-10-27 DIAGNOSIS — I10 ESSENTIAL (PRIMARY) HYPERTENSION: ICD-10-CM

## 2021-10-27 DIAGNOSIS — Z90.49 ACQUIRED ABSENCE OF OTHER SPECIFIED PARTS OF DIGESTIVE TRACT: Chronic | ICD-10-CM

## 2021-10-27 DIAGNOSIS — Z90.89 ACQUIRED ABSENCE OF OTHER ORGANS: Chronic | ICD-10-CM

## 2021-10-27 DIAGNOSIS — E78.5 HYPERLIPIDEMIA, UNSPECIFIED: ICD-10-CM

## 2021-10-27 LAB
ANION GAP SERPL CALC-SCNC: 11 MMOL/L — SIGNIFICANT CHANGE UP (ref 7–14)
BUN SERPL-MCNC: 14 MG/DL — SIGNIFICANT CHANGE UP (ref 7–23)
CALCIUM SERPL-MCNC: 9 MG/DL — SIGNIFICANT CHANGE UP (ref 8.4–10.5)
CHLORIDE SERPL-SCNC: 101 MMOL/L — SIGNIFICANT CHANGE UP (ref 98–107)
CO2 SERPL-SCNC: 25 MMOL/L — SIGNIFICANT CHANGE UP (ref 22–31)
CREAT SERPL-MCNC: 0.62 MG/DL — SIGNIFICANT CHANGE UP (ref 0.5–1.3)
GLUCOSE SERPL-MCNC: 112 MG/DL — HIGH (ref 70–99)
HCT VFR BLD CALC: 38.3 % — SIGNIFICANT CHANGE UP (ref 34.5–45)
HGB BLD-MCNC: 13.1 G/DL — SIGNIFICANT CHANGE UP (ref 11.5–15.5)
MCHC RBC-ENTMCNC: 30.2 PG — SIGNIFICANT CHANGE UP (ref 27–34)
MCHC RBC-ENTMCNC: 34.2 GM/DL — SIGNIFICANT CHANGE UP (ref 32–36)
MCV RBC AUTO: 88.2 FL — SIGNIFICANT CHANGE UP (ref 80–100)
NRBC # BLD: 0 /100 WBCS — SIGNIFICANT CHANGE UP
NRBC # FLD: 0 K/UL — SIGNIFICANT CHANGE UP
PLATELET # BLD AUTO: 239 K/UL — SIGNIFICANT CHANGE UP (ref 150–400)
POTASSIUM SERPL-MCNC: 4.1 MMOL/L — SIGNIFICANT CHANGE UP (ref 3.5–5.3)
POTASSIUM SERPL-SCNC: 4.1 MMOL/L — SIGNIFICANT CHANGE UP (ref 3.5–5.3)
RBC # BLD: 4.34 M/UL — SIGNIFICANT CHANGE UP (ref 3.8–5.2)
RBC # FLD: 15.7 % — HIGH (ref 10.3–14.5)
SODIUM SERPL-SCNC: 137 MMOL/L — SIGNIFICANT CHANGE UP (ref 135–145)
WBC # BLD: 5.96 K/UL — SIGNIFICANT CHANGE UP (ref 3.8–10.5)
WBC # FLD AUTO: 5.96 K/UL — SIGNIFICANT CHANGE UP (ref 3.8–10.5)

## 2021-10-27 PROCEDURE — 93010 ELECTROCARDIOGRAM REPORT: CPT

## 2021-10-27 RX ORDER — ZINC SULFATE TAB 220 MG (50 MG ZINC EQUIVALENT) 220 (50 ZN) MG
1 TAB ORAL
Qty: 0 | Refills: 0 | DISCHARGE

## 2021-10-27 RX ORDER — ASCORBIC ACID 60 MG
1 TABLET,CHEWABLE ORAL
Qty: 0 | Refills: 0 | DISCHARGE

## 2021-10-27 RX ORDER — SODIUM CHLORIDE 9 MG/ML
1000 INJECTION, SOLUTION INTRAVENOUS
Refills: 0 | Status: DISCONTINUED | OUTPATIENT
Start: 2021-11-05 | End: 2021-11-19

## 2021-10-27 NOTE — H&P PST ADULT - ATTENDING PHYSICIAN: I HAVE REVIEWED THE CLINICAL DOCUMENTATION AND AGREE WITH THE ABOVE NOTE
Ambulatory Care 
Statement Selected

## 2021-10-27 NOTE — H&P PST ADULT - ASSESSMENT
66 y.o. female presents to PST unit with pre-op diagnosis of other diseases of vocal cords scheduled for microdirect laryngoscopy with resection of right vocal cord lesion with Dr. Holman.

## 2021-10-27 NOTE — H&P PST ADULT - NEGATIVE NEUROLOGICAL SYMPTOMS
no weakness/no generalized seizures/no focal seizures/no headache no transient paralysis/no weakness/no generalized seizures/no focal seizures/no headache

## 2021-10-27 NOTE — H&P PST ADULT - NSICDXPASTMEDICALHX_GEN_ALL_CORE_FT
PAST MEDICAL HISTORY:  CVA (cerebral vascular accident) 2019    Hypertension     Multiple sclerosis      PAST MEDICAL HISTORY:  CVA (cerebral vascular accident) 2019    H/O left bundle branch block     Hypertension     Multiple sclerosis

## 2021-10-27 NOTE — H&P PST ADULT - RS GEN PE MLT RESP DETAILS PC
breath sounds equal/respirations non-labored/clear to auscultation bilaterally/no rales/no rhonchi/no wheezes breath sounds equal/good air movement/respirations non-labored/clear to auscultation bilaterally/no wheezes

## 2021-10-27 NOTE — H&P PST ADULT - PROBLEM SELECTOR PLAN 1
scheduled for microdirect laryngoscopy with resection of right vocal cord lesion with Dr. Holman on 11/05/2021.  Verbal and written pre-op instructions provided to patient. Patient verbalized understanding and will call surgeons office for revised instructions if surgery is rescheduled.   Pepcid for GI prophylaxis provided.   Patient aware of need for COVID testing prior to  procedure at a [facility]  and advised to coordinate with surgeon to get tested within 72 hours of procedure. scheduled for microdirect laryngoscopy with resection of right vocal cord lesion with Dr. Holman on 11/05/2021.  Verbal and written pre-op instructions provided to patient. Patient verbalized understanding and will call surgeons office for revised instructions if surgery is rescheduled.   Pepcid for GI prophylaxis provided.   Patient aware of need for COVID testing prior to  procedure at a [facility]  and advised to coordinate with surgeon to get tested within 72 hours of procedure.  Patient will obtain medical clearance as per surgeons request-copy requested for age.

## 2021-11-02 ENCOUNTER — APPOINTMENT (OUTPATIENT)
Dept: DISASTER EMERGENCY | Facility: CLINIC | Age: 66
End: 2021-11-02

## 2021-11-03 LAB — SARS-COV-2 N GENE NPH QL NAA+PROBE: NOT DETECTED

## 2021-11-04 ENCOUNTER — TRANSCRIPTION ENCOUNTER (OUTPATIENT)
Age: 66
End: 2021-11-04

## 2021-11-04 NOTE — ASU PATIENT PROFILE, ADULT - MENTAL HEALTH CONDITIONS/SYMPTOMS, PROFILE
This is a 33 y/o female received ambulatory AXO&4 c/o sob. Patient report that was in the ED 5 days prior for having a low hemoglobin of 6.8 and received a blood transfusion. Patient report that she is having the same symptoms. She denies any nausea, vomiting, fever, chills, diarrhea. Plan of care explained to patient will monitor support and safety maintained. none

## 2021-11-05 ENCOUNTER — RESULT REVIEW (OUTPATIENT)
Age: 66
End: 2021-11-05

## 2021-11-05 ENCOUNTER — OUTPATIENT (OUTPATIENT)
Dept: OUTPATIENT SERVICES | Facility: HOSPITAL | Age: 66
LOS: 1 days | Discharge: HOME | End: 2021-11-05
Payer: MEDICARE

## 2021-11-05 ENCOUNTER — APPOINTMENT (OUTPATIENT)
Dept: OTOLARYNGOLOGY | Facility: HOSPITAL | Age: 66
End: 2021-11-05

## 2021-11-05 VITALS — RESPIRATION RATE: 16 BRPM | DIASTOLIC BLOOD PRESSURE: 60 MMHG | SYSTOLIC BLOOD PRESSURE: 135 MMHG | HEART RATE: 77 BPM

## 2021-11-05 VITALS
HEART RATE: 80 BPM | SYSTOLIC BLOOD PRESSURE: 119 MMHG | RESPIRATION RATE: 16 BRPM | OXYGEN SATURATION: 99 % | DIASTOLIC BLOOD PRESSURE: 49 MMHG | HEIGHT: 65 IN | TEMPERATURE: 98 F | WEIGHT: 132.94 LBS

## 2021-11-05 DIAGNOSIS — Z90.89 ACQUIRED ABSENCE OF OTHER ORGANS: Chronic | ICD-10-CM

## 2021-11-05 DIAGNOSIS — Z90.49 ACQUIRED ABSENCE OF OTHER SPECIFIED PARTS OF DIGESTIVE TRACT: Chronic | ICD-10-CM

## 2021-11-05 DIAGNOSIS — J38.3 OTHER DISEASES OF VOCAL CORDS: ICD-10-CM

## 2021-11-05 PROCEDURE — 31541 LARYNSCOP W/TUMR EXC + SCOPE: CPT

## 2021-11-05 PROCEDURE — 88305 TISSUE EXAM BY PATHOLOGIST: CPT | Mod: 26

## 2021-11-05 RX ORDER — ATORVASTATIN CALCIUM 80 MG/1
1 TABLET, FILM COATED ORAL
Qty: 0 | Refills: 0 | DISCHARGE

## 2021-11-05 RX ORDER — INTERFERON BETA-1A 22 UG/.5ML
0 INJECTION, SOLUTION SUBCUTANEOUS
Qty: 0 | Refills: 0 | DISCHARGE

## 2021-11-05 RX ORDER — HYDROMORPHONE HYDROCHLORIDE 2 MG/ML
0.5 INJECTION INTRAMUSCULAR; INTRAVENOUS; SUBCUTANEOUS
Refills: 0 | Status: DISCONTINUED | OUTPATIENT
Start: 2021-11-05 | End: 2021-11-05

## 2021-11-05 RX ORDER — AMLODIPINE BESYLATE 2.5 MG/1
1 TABLET ORAL
Qty: 0 | Refills: 0 | DISCHARGE

## 2021-11-05 RX ORDER — ASPIRIN/CALCIUM CARB/MAGNESIUM 324 MG
1 TABLET ORAL
Qty: 0 | Refills: 0 | DISCHARGE

## 2021-11-05 RX ORDER — ONDANSETRON 8 MG/1
1 TABLET, FILM COATED ORAL
Qty: 4 | Refills: 0
Start: 2021-11-05 | End: 2021-11-05

## 2021-11-05 RX ORDER — FENTANYL CITRATE 50 UG/ML
25 INJECTION INTRAVENOUS
Refills: 0 | Status: DISCONTINUED | OUTPATIENT
Start: 2021-11-05 | End: 2021-11-05

## 2021-11-05 RX ORDER — OMEPRAZOLE 10 MG/1
1 CAPSULE, DELAYED RELEASE ORAL
Qty: 60 | Refills: 0
Start: 2021-11-05

## 2021-11-05 RX ORDER — ONDANSETRON 8 MG/1
4 TABLET, FILM COATED ORAL ONCE
Refills: 0 | Status: COMPLETED | OUTPATIENT
Start: 2021-11-05 | End: 2021-11-05

## 2021-11-05 RX ADMIN — ONDANSETRON 4 MILLIGRAM(S): 8 TABLET, FILM COATED ORAL at 15:50

## 2021-11-05 RX ADMIN — HYDROMORPHONE HYDROCHLORIDE 0.5 MILLIGRAM(S): 2 INJECTION INTRAMUSCULAR; INTRAVENOUS; SUBCUTANEOUS at 15:41

## 2021-11-05 NOTE — ASU DISCHARGE PLAN (ADULT/PEDIATRIC) - ASU DC SPECIAL INSTRUCTIONSFT
Please strictly rest your voice for the next 5 days  Please avoid clearing your throat  Please take omeprazole daily for the next 6 weeks

## 2021-11-05 NOTE — ASU DISCHARGE PLAN (ADULT/PEDIATRIC) - CALL YOUR DOCTOR IF YOU HAVE ANY OF THE FOLLOWING:
Bleeding that does not stop/Pain not relieved by Medications/Fever greater than (need to indicate Fahrenheit or Celsius)/Nausea and vomiting that does not stop/Excessive diarrhea

## 2021-11-05 NOTE — BRIEF OPERATIVE NOTE - NSICDXBRIEFPROCEDURE_GEN_ALL_CORE_FT
PROCEDURES:  Direct laryngoscopy in adult 05-Nov-2021 15:07:32  Teddy Andrew  Excision, vocal cord 05-Nov-2021 15:07:42  Teddy Andrew

## 2021-11-09 PROBLEM — I63.9 CEREBRAL INFARCTION, UNSPECIFIED: Chronic | Status: ACTIVE | Noted: 2021-10-27

## 2021-11-09 PROBLEM — Z86.79 PERSONAL HISTORY OF OTHER DISEASES OF THE CIRCULATORY SYSTEM: Chronic | Status: ACTIVE | Noted: 2021-10-27

## 2021-11-10 LAB — SURGICAL PATHOLOGY STUDY: SIGNIFICANT CHANGE UP

## 2021-11-11 ENCOUNTER — NON-APPOINTMENT (OUTPATIENT)
Age: 66
End: 2021-11-11

## 2021-12-10 ENCOUNTER — APPOINTMENT (OUTPATIENT)
Dept: OTOLARYNGOLOGY | Facility: CLINIC | Age: 66
End: 2021-12-10
Payer: MEDICARE

## 2021-12-10 PROCEDURE — 31575 DIAGNOSTIC LARYNGOSCOPY: CPT

## 2021-12-10 PROCEDURE — 99213 OFFICE O/P EST LOW 20 MIN: CPT | Mod: 25

## 2021-12-10 NOTE — HISTORY OF PRESENT ILLNESS
[de-identified] : 66 year old female with history of Right parotid nodule s/p Right total parotidectomy, Right sternocleidomastoid flap reconstruction, 07/25/2020. FNA 2/12/2021 of neck LN was benign. Last MRI  8/16/21. No adjuvant treatment. Patient presents today for follow up. States feeling well overall, no complaints at today's visit. Last MRI Neck  08/16/2021 \par Complete review of systems which was performed during a previous encounter was reviewed with the patient and there are no changes except as stated in the HPI section.

## 2021-12-10 NOTE — CONSULT LETTER
[Dear  ___] : Dear  [unfilled], [Courtesy Letter:] : I had the pleasure of seeing your patient, [unfilled], in my office today. [Please see my note below.] : Please see my note below. [Consult Closing:] : Thank you very much for allowing me to participate in the care of this patient.  If you have any questions, please do not hesitate to contact me. [Sincerely,] : Sincerely, [FreeTextEntry2] : Dr Renzo Larson  [FreeTextEntry3] : \par Dieter Holman MD, FACS\par \par Otolaryngology-Head and Neck Surgery\par [employer] at [facility]\par

## 2022-03-11 ENCOUNTER — APPOINTMENT (OUTPATIENT)
Dept: OTOLARYNGOLOGY | Facility: CLINIC | Age: 67
End: 2022-03-11
Payer: MEDICARE

## 2022-03-11 VITALS
HEART RATE: 83 BPM | DIASTOLIC BLOOD PRESSURE: 70 MMHG | HEIGHT: 65 IN | BODY MASS INDEX: 21.83 KG/M2 | SYSTOLIC BLOOD PRESSURE: 144 MMHG | WEIGHT: 131 LBS

## 2022-03-11 PROCEDURE — 99213 OFFICE O/P EST LOW 20 MIN: CPT

## 2022-03-11 NOTE — HISTORY OF PRESENT ILLNESS
[de-identified] : 66 year old female with history of Right parotid nodule s/p Right total parotidectomy, Right sternocleidomastoid flap reconstruction, 07/25/2020. FNA 2/12/2021 of neck LN was benign. Last MRI  8/16/21. No adjuvant treatment. \par Complete review of systems which was performed during a previous encounter was reviewed with the patient and there are no changes except as stated in the HPI section.

## 2022-06-10 ENCOUNTER — APPOINTMENT (OUTPATIENT)
Dept: OTOLARYNGOLOGY | Facility: CLINIC | Age: 67
End: 2022-06-10
Payer: MEDICARE

## 2022-06-10 VITALS
HEART RATE: 80 BPM | BODY MASS INDEX: 21.83 KG/M2 | HEIGHT: 65 IN | SYSTOLIC BLOOD PRESSURE: 149 MMHG | WEIGHT: 131 LBS | DIASTOLIC BLOOD PRESSURE: 76 MMHG

## 2022-06-10 PROCEDURE — 99214 OFFICE O/P EST MOD 30 MIN: CPT

## 2022-06-10 NOTE — HISTORY OF PRESENT ILLNESS
[de-identified] : 66 year old female with history of Right parotid nodule s/p Right total parotidectomy, Right sternocleidomastoid flap reconstruction, 07/25/2020. FNA 2/12/2021 of neck LN was benign. Last MRI  8/16/21. 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

## 2022-06-10 NOTE — CONSULT LETTER
[Dear  ___] : Dear  [unfilled], [Courtesy Letter:] : I had the pleasure of seeing your patient, [unfilled], in my office today. [Please see my note below.] : Please see my note below. [Consult Closing:] : Thank you very much for allowing me to participate in the care of this patient.  If you have any questions, please do not hesitate to contact me. [Sincerely,] : Sincerely, [FreeTextEntry2] : Dr Renzo Larson  [FreeTextEntry3] : \par Dieter Holman MD, FACS\par \par Otolaryngology-Head and Neck Surgery\par [employer] at [facility]\par

## 2022-08-22 ENCOUNTER — APPOINTMENT (OUTPATIENT)
Dept: ULTRASOUND IMAGING | Facility: CLINIC | Age: 67
End: 2022-08-22

## 2022-08-22 ENCOUNTER — OUTPATIENT (OUTPATIENT)
Dept: OUTPATIENT SERVICES | Facility: HOSPITAL | Age: 67
LOS: 1 days | End: 2022-08-22

## 2022-08-22 DIAGNOSIS — Z90.89 ACQUIRED ABSENCE OF OTHER ORGANS: Chronic | ICD-10-CM

## 2022-08-22 DIAGNOSIS — C07 MALIGNANT NEOPLASM OF PAROTID GLAND: ICD-10-CM

## 2022-08-22 DIAGNOSIS — Z90.49 ACQUIRED ABSENCE OF OTHER SPECIFIED PARTS OF DIGESTIVE TRACT: Chronic | ICD-10-CM

## 2022-08-22 PROCEDURE — 76536 US EXAM OF HEAD AND NECK: CPT | Mod: 26

## 2022-08-30 ENCOUNTER — NON-APPOINTMENT (OUTPATIENT)
Age: 67
End: 2022-08-30

## 2022-09-26 NOTE — ED PROVIDER NOTE - MUSCULOSKELETAL, MLM
Patient is following up on new blood pressure medication that was talked about in her appointment last week.  Has medication been called into pharmacy?    Please return call to confirm back to patient [phone]    Pharmacy nik       Spine appears normal, range of motion is not limited, no muscle or joint tenderness

## 2022-10-14 ENCOUNTER — APPOINTMENT (OUTPATIENT)
Dept: OTOLARYNGOLOGY | Facility: CLINIC | Age: 67
End: 2022-10-14

## 2022-10-14 VITALS
BODY MASS INDEX: 21.83 KG/M2 | HEIGHT: 65 IN | HEART RATE: 85 BPM | WEIGHT: 131 LBS | DIASTOLIC BLOOD PRESSURE: 61 MMHG | SYSTOLIC BLOOD PRESSURE: 127 MMHG

## 2022-10-14 DIAGNOSIS — J38.1 POLYP OF VOCAL CORD AND LARYNX: ICD-10-CM

## 2022-10-14 PROCEDURE — 99214 OFFICE O/P EST MOD 30 MIN: CPT

## 2022-10-14 NOTE — CONSULT LETTER
[Dear  ___] : Dear  [unfilled], [Courtesy Letter:] : I had the pleasure of seeing your patient, [unfilled], in my office today. [Please see my note below.] : Please see my note below. [Consult Closing:] : Thank you very much for allowing me to participate in the care of this patient.  If you have any questions, please do not hesitate to contact me. [Sincerely,] : Sincerely, [FreeTextEntry2] : Dr Renzo Larson  [FreeTextEntry3] : \par Dieter Holman MD, FACS\par \par Otolaryngology-Head and Neck Surgery\par [employer] at [facility]\par

## 2022-10-14 NOTE — HISTORY OF PRESENT ILLNESS
[de-identified] : 67 year old female with history of Right parotid nodule s/p Right total parotidectomy, Right sternocleidomastoid flap reconstruction, 07/25/2020. No adjuvant treatment.\par FNA 2/12/2021 of neck LN was benign. Last MRI  8/16/21. Last HN US 8/22/22, No abnormal soft tissue lesion or adenopathy seen within the neck. 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

## 2022-12-01 ENCOUNTER — APPOINTMENT (OUTPATIENT)
Dept: NEUROSURGERY | Facility: CLINIC | Age: 67
End: 2022-12-01

## 2022-12-01 ENCOUNTER — NON-APPOINTMENT (OUTPATIENT)
Age: 67
End: 2022-12-01

## 2022-12-01 VITALS
WEIGHT: 131 LBS | BODY MASS INDEX: 21.83 KG/M2 | DIASTOLIC BLOOD PRESSURE: 77 MMHG | HEART RATE: 81 BPM | OXYGEN SATURATION: 97 % | SYSTOLIC BLOOD PRESSURE: 135 MMHG | HEIGHT: 65 IN

## 2022-12-01 DIAGNOSIS — I67.1 CEREBRAL ANEURYSM, NONRUPTURED: ICD-10-CM

## 2022-12-01 PROCEDURE — 99205 OFFICE O/P NEW HI 60 MIN: CPT

## 2022-12-02 NOTE — HISTORY OF PRESENT ILLNESS
[de-identified] : Kinjal Del Cid is a 67yr old female here for a new patient visit. She has history of MS. She has history of stroke from lior blood pressure 2-3years ago went to [facility] was told small stroke and she didn't have any residual deficits. Saw neurologist Dr. Marr who ordered mri mra brain. MRA brain demonstrated the presence of 2mm right paraclinoid/opthalmic aneurysm. Here today to discuss the aneurysm. Today she feels well. Family history of aunt who had subarachnoid hemorrhage from ruptured cerebral aneurysm. Social history of former smoker. \par \par PCP Dr. Worrell\par Dr. Waggoner Apakwadwo MS doctor

## 2022-12-02 NOTE — ASSESSMENT
[FreeTextEntry1] : Impression: 67yr old female with 2mm right paraclinoid/opthalmic aneurysm\par \par Plan:\par Personally reviewed the imaging and can not definitively say whether she has an aneurysm or not due to low resolution of the mra imaging. \par Educated patient on signs and symptoms of subarachnoid hemorrhage and should they experience worst headache of life will seek medical attention immediately.\par Discussed diagnostic cerebral angiography to definitely map out the aneurysm to determine size and location. The risks, benefits, alternative, complications and personnel associated with the procedure were discussed with the patient  in great detail.  She wishes to proceed 1/24/2023\par Conservative management would consist of mra brain non con in 1year \par

## 2023-01-16 NOTE — ASU PATIENT PROFILE, ADULT - MENTAL HEALTH CONDITIONS/SYMPTOMS, PROFILE
[de-identified] : 83 year old patient followup for acute exacerbation of chronic neck and low back pain. She denies recent illness, fevers, weakness, balance problems, saddle anesthesia, urinary retention or fecal incontinence. Since her last appointment she has been doing PT with improvement in her pain. She will take tizanidine as needed with relief. \par \par 
none

## 2023-01-24 ENCOUNTER — APPOINTMENT (OUTPATIENT)
Dept: NEUROSURGERY | Facility: HOSPITAL | Age: 68
End: 2023-01-24

## 2023-04-04 ENCOUNTER — APPOINTMENT (OUTPATIENT)
Dept: OTOLARYNGOLOGY | Facility: CLINIC | Age: 68
End: 2023-04-04

## 2023-05-12 ENCOUNTER — APPOINTMENT (OUTPATIENT)
Dept: OTOLARYNGOLOGY | Facility: CLINIC | Age: 68
End: 2023-05-12
Payer: MEDICARE

## 2023-05-12 VITALS
BODY MASS INDEX: 21.83 KG/M2 | HEART RATE: 78 BPM | DIASTOLIC BLOOD PRESSURE: 70 MMHG | WEIGHT: 131 LBS | SYSTOLIC BLOOD PRESSURE: 130 MMHG | HEIGHT: 65 IN

## 2023-05-12 PROCEDURE — 99214 OFFICE O/P EST MOD 30 MIN: CPT

## 2023-05-12 NOTE — CONSULT LETTER
[Dear  ___] : Dear  [unfilled], [Courtesy Letter:] : I had the pleasure of seeing your patient, [unfilled], in my office today. [Please see my note below.] : Please see my note below. [Consult Closing:] : Thank you very much for allowing me to participate in the care of this patient.  If you have any questions, please do not hesitate to contact me. [Sincerely,] : Sincerely, [FreeTextEntry2] : Dr Renzo Larson  [FreeTextEntry3] : \par Dieter Holman MD, FACS\par \par Otolaryngology-Head and Neck Surgery\par [employer] [facility]\par

## 2023-05-12 NOTE — HISTORY OF PRESENT ILLNESS
[de-identified] : 67 year old female with history of Right parotid nodule s/p Right total parotidectomy, Right sternocleidomastoid flap reconstruction, 07/25/2020. No adjuvant treatment.\par \par FNA 2/12/2021 of neck LN was benign.\par \par Biopsy R vocal cord 11/5/21 - benign\par \par Last US neck 8/22/22:\par IMPRESSION:\par \par No abnormal soft tissue lesion or adenopathy seen within the neck.\par \par 1.2 x 1.0 x 1.0 cm cystic solid nodule left thyroid\par \par Pt has R neck pulling sensation since she lifted a heavy object in early April.  Other than that she feels well.  \par \par Complete review of systems which was performed during a previous encounter was reviewed with the patient and there are no changes except as stated in the HPI section.

## 2023-10-30 ENCOUNTER — APPOINTMENT (OUTPATIENT)
Dept: ULTRASOUND IMAGING | Facility: CLINIC | Age: 68
End: 2023-10-30
Payer: MEDICARE

## 2023-10-30 ENCOUNTER — OUTPATIENT (OUTPATIENT)
Dept: OUTPATIENT SERVICES | Facility: HOSPITAL | Age: 68
LOS: 1 days | End: 2023-10-30

## 2023-10-30 DIAGNOSIS — Z90.49 ACQUIRED ABSENCE OF OTHER SPECIFIED PARTS OF DIGESTIVE TRACT: Chronic | ICD-10-CM

## 2023-10-30 DIAGNOSIS — Z90.89 ACQUIRED ABSENCE OF OTHER ORGANS: Chronic | ICD-10-CM

## 2023-10-30 DIAGNOSIS — Z00.8 ENCOUNTER FOR OTHER GENERAL EXAMINATION: ICD-10-CM

## 2023-10-30 PROCEDURE — 76536 US EXAM OF HEAD AND NECK: CPT | Mod: 26

## 2023-11-10 ENCOUNTER — APPOINTMENT (OUTPATIENT)
Dept: OTOLARYNGOLOGY | Facility: CLINIC | Age: 68
End: 2023-11-10
Payer: MEDICARE

## 2023-11-10 VITALS
HEIGHT: 66 IN | WEIGHT: 130 LBS | DIASTOLIC BLOOD PRESSURE: 61 MMHG | HEART RATE: 74 BPM | SYSTOLIC BLOOD PRESSURE: 138 MMHG | BODY MASS INDEX: 20.89 KG/M2

## 2023-11-10 DIAGNOSIS — G35 MULTIPLE SCLEROSIS: ICD-10-CM

## 2023-11-10 DIAGNOSIS — E78.00 PURE HYPERCHOLESTEROLEMIA, UNSPECIFIED: ICD-10-CM

## 2023-11-10 DIAGNOSIS — I10 ESSENTIAL (PRIMARY) HYPERTENSION: ICD-10-CM

## 2023-11-10 DIAGNOSIS — J38.3 OTHER DISEASES OF VOCAL CORDS: ICD-10-CM

## 2023-11-10 DIAGNOSIS — K11.8 OTHER DISEASES OF SALIVARY GLANDS: ICD-10-CM

## 2023-11-10 PROCEDURE — 31575 DIAGNOSTIC LARYNGOSCOPY: CPT

## 2023-11-10 PROCEDURE — 99214 OFFICE O/P EST MOD 30 MIN: CPT | Mod: 25

## 2023-11-10 RX ORDER — AMLODIPINE BESYLATE 5 MG/1
5 TABLET ORAL DAILY
Refills: 0 | Status: ACTIVE | COMMUNITY

## 2023-11-10 RX ORDER — ATORVASTATIN CALCIUM 20 MG/1
20 TABLET, FILM COATED ORAL
Refills: 0 | Status: ACTIVE | COMMUNITY

## 2023-11-10 RX ORDER — INTERFERON BETA-1A 30MCG/.5ML
KIT INTRAMUSCULAR
Refills: 0 | Status: ACTIVE | COMMUNITY

## 2024-05-13 ENCOUNTER — NON-APPOINTMENT (OUTPATIENT)
Age: 69
End: 2024-05-13

## 2024-05-14 ENCOUNTER — APPOINTMENT (OUTPATIENT)
Dept: OTOLARYNGOLOGY | Facility: CLINIC | Age: 69
End: 2024-05-14
Payer: MEDICARE

## 2024-05-14 VITALS
DIASTOLIC BLOOD PRESSURE: 58 MMHG | WEIGHT: 135 LBS | SYSTOLIC BLOOD PRESSURE: 102 MMHG | HEART RATE: 70 BPM | BODY MASS INDEX: 21.69 KG/M2 | HEIGHT: 66 IN

## 2024-05-14 DIAGNOSIS — C07 MALIGNANT NEOPLASM OF PAROTID GLAND: ICD-10-CM

## 2024-05-14 PROCEDURE — 99212 OFFICE O/P EST SF 10 MIN: CPT

## 2024-05-14 NOTE — HISTORY OF PRESENT ILLNESS
[No] : patient does not have a  history of radiation therapy [de-identified] : 68 year old female presents for follow up for right parotid nodule s/p Right total parotidectomy, Right sternocleidomastoid flap reconstruction, 07/25/2020. No adjuvant treatment. FNA 2/12/2021 of neck LN was benign. Biopsy R vocal cord 11/5/2021 - benign Last US neck 10/30/2023: No evidence for recurrence.  Today pt with no new head/neck complaints. Denies neck or facial pain, swelling, or weakness.  Denies dyspnea, dysphagia, dysphonia, fever, chills, or weight loss. Eating and drinking without issues. Voice is stable.

## 2024-11-04 ENCOUNTER — APPOINTMENT (OUTPATIENT)
Dept: ULTRASOUND IMAGING | Facility: CLINIC | Age: 69
End: 2024-11-04
Payer: MEDICARE

## 2024-11-04 ENCOUNTER — OUTPATIENT (OUTPATIENT)
Dept: OUTPATIENT SERVICES | Facility: HOSPITAL | Age: 69
LOS: 1 days | End: 2024-11-04

## 2024-11-04 DIAGNOSIS — C07 MALIGNANT NEOPLASM OF PAROTID GLAND: ICD-10-CM

## 2024-11-04 DIAGNOSIS — Z90.49 ACQUIRED ABSENCE OF OTHER SPECIFIED PARTS OF DIGESTIVE TRACT: Chronic | ICD-10-CM

## 2024-11-04 DIAGNOSIS — Z90.89 ACQUIRED ABSENCE OF OTHER ORGANS: Chronic | ICD-10-CM

## 2024-11-04 PROCEDURE — 76536 US EXAM OF HEAD AND NECK: CPT | Mod: 26

## 2024-11-12 ENCOUNTER — APPOINTMENT (OUTPATIENT)
Dept: OTOLARYNGOLOGY | Facility: CLINIC | Age: 69
End: 2024-11-12
Payer: MEDICARE

## 2024-11-12 VITALS
SYSTOLIC BLOOD PRESSURE: 139 MMHG | WEIGHT: 15 LBS | DIASTOLIC BLOOD PRESSURE: 73 MMHG | BODY MASS INDEX: 2.41 KG/M2 | HEART RATE: 78 BPM | HEIGHT: 66 IN

## 2024-11-12 DIAGNOSIS — C07 MALIGNANT NEOPLASM OF PAROTID GLAND: ICD-10-CM

## 2024-11-12 PROCEDURE — 99212 OFFICE O/P EST SF 10 MIN: CPT

## 2024-11-12 RX ORDER — DIAZEPAM 5 MG/1
TABLET ORAL
Refills: 0 | Status: ACTIVE | COMMUNITY

## 2024-12-11 ENCOUNTER — APPOINTMENT (OUTPATIENT)
Dept: NUCLEAR MEDICINE | Facility: CLINIC | Age: 69
End: 2024-12-11
Payer: MEDICARE

## 2024-12-11 ENCOUNTER — OUTPATIENT (OUTPATIENT)
Dept: OUTPATIENT SERVICES | Facility: HOSPITAL | Age: 69
LOS: 1 days | End: 2024-12-11

## 2024-12-11 ENCOUNTER — RESULT REVIEW (OUTPATIENT)
Age: 69
End: 2024-12-11

## 2024-12-11 DIAGNOSIS — Z90.49 ACQUIRED ABSENCE OF OTHER SPECIFIED PARTS OF DIGESTIVE TRACT: Chronic | ICD-10-CM

## 2024-12-11 DIAGNOSIS — Z90.89 ACQUIRED ABSENCE OF OTHER ORGANS: Chronic | ICD-10-CM

## 2024-12-11 DIAGNOSIS — Z00.8 ENCOUNTER FOR OTHER GENERAL EXAMINATION: ICD-10-CM

## 2024-12-11 PROCEDURE — 78815 PET IMAGE W/CT SKULL-THIGH: CPT | Mod: 26,PI